# Patient Record
Sex: FEMALE | Race: WHITE | Employment: UNEMPLOYED | URBAN - METROPOLITAN AREA
[De-identification: names, ages, dates, MRNs, and addresses within clinical notes are randomized per-mention and may not be internally consistent; named-entity substitution may affect disease eponyms.]

---

## 2018-05-22 PROBLEM — R68.84 PAIN IN LOWER JAW: Status: ACTIVE | Noted: 2018-05-22

## 2018-08-24 ENCOUNTER — HOSPITAL ENCOUNTER (OUTPATIENT)
Dept: MAMMOGRAPHY | Age: 58
Discharge: HOME | End: 2018-08-24
Attending: NURSE PRACTITIONER
Payer: COMMERCIAL

## 2018-08-24 DIAGNOSIS — Z12.31 ENCOUNTER FOR SCREENING MAMMOGRAM FOR MALIGNANT NEOPLASM OF BREAST: ICD-10-CM

## 2018-08-24 PROCEDURE — 77067 SCR MAMMO BI INCL CAD: CPT

## 2019-11-13 ENCOUNTER — HOSPITAL ENCOUNTER (OUTPATIENT)
Dept: MAMMOGRAPHY | Age: 59
Discharge: HOME | End: 2019-11-13
Attending: NURSE PRACTITIONER
Payer: COMMERCIAL

## 2019-11-13 DIAGNOSIS — Z79.890 SURGICAL MENOPAUSE ON HORMONE REPLACEMENT THERAPY: ICD-10-CM

## 2019-11-13 DIAGNOSIS — E89.40 SURGICAL MENOPAUSE ON HORMONE REPLACEMENT THERAPY: ICD-10-CM

## 2019-11-13 DIAGNOSIS — Z12.39 ENCOUNTER FOR SCREENING FOR MALIGNANT NEOPLASM OF BREAST: ICD-10-CM

## 2019-11-13 PROCEDURE — 77080 DXA BONE DENSITY AXIAL: CPT

## 2019-11-13 PROCEDURE — 77067 SCR MAMMO BI INCL CAD: CPT

## 2020-10-22 ENCOUNTER — APPOINTMENT (RX ONLY)
Dept: URBAN - METROPOLITAN AREA CLINIC 23 | Facility: CLINIC | Age: 60
Setting detail: DERMATOLOGY
End: 2020-10-22

## 2020-10-22 VITALS — TEMPERATURE: 98.4 F

## 2020-10-22 DIAGNOSIS — D485 NEOPLASM OF UNCERTAIN BEHAVIOR OF SKIN: ICD-10-CM

## 2020-10-22 DIAGNOSIS — D18.0 HEMANGIOMA: ICD-10-CM

## 2020-10-22 DIAGNOSIS — D22 MELANOCYTIC NEVI: ICD-10-CM

## 2020-10-22 DIAGNOSIS — L82.1 OTHER SEBORRHEIC KERATOSIS: ICD-10-CM

## 2020-10-22 PROBLEM — D22.72 MELANOCYTIC NEVI OF LEFT LOWER LIMB, INCLUDING HIP: Status: ACTIVE | Noted: 2020-10-22

## 2020-10-22 PROBLEM — D48.5 NEOPLASM OF UNCERTAIN BEHAVIOR OF SKIN: Status: ACTIVE | Noted: 2020-10-22

## 2020-10-22 PROBLEM — D18.01 HEMANGIOMA OF SKIN AND SUBCUTANEOUS TISSUE: Status: ACTIVE | Noted: 2020-10-22

## 2020-10-22 PROBLEM — D22.5 MELANOCYTIC NEVI OF TRUNK: Status: ACTIVE | Noted: 2020-10-22

## 2020-10-22 PROCEDURE — 11102 TANGNTL BX SKIN SINGLE LES: CPT

## 2020-10-22 PROCEDURE — ? BIOPSY BY SHAVE METHOD

## 2020-10-22 PROCEDURE — ? OTHER

## 2020-10-22 PROCEDURE — ? COUNSELING

## 2020-10-22 PROCEDURE — 99203 OFFICE O/P NEW LOW 30 MIN: CPT | Mod: 25

## 2020-10-22 PROCEDURE — 11103 TANGNTL BX SKIN EA SEP/ADDL: CPT

## 2020-10-22 ASSESSMENT — LOCATION SIMPLE DESCRIPTION DERM
LOCATION SIMPLE: LEFT UPPER BACK
LOCATION SIMPLE: LEFT THIGH
LOCATION SIMPLE: LOWER BACK
LOCATION SIMPLE: RIGHT LOWER BACK
LOCATION SIMPLE: ABDOMEN
LOCATION SIMPLE: LEFT POSTERIOR THIGH

## 2020-10-22 ASSESSMENT — LOCATION DETAILED DESCRIPTION DERM
LOCATION DETAILED: LEFT SUPERIOR UPPER BACK
LOCATION DETAILED: RIGHT RIB CAGE
LOCATION DETAILED: LEFT DISTAL POSTERIOR THIGH
LOCATION DETAILED: LEFT ANTERIOR MEDIAL PROXIMAL THIGH
LOCATION DETAILED: LEFT MEDIAL UPPER BACK
LOCATION DETAILED: LEFT ANTERIOR PROXIMAL THIGH
LOCATION DETAILED: RIGHT INFERIOR LATERAL MIDBACK
LOCATION DETAILED: LEFT RIB CAGE
LOCATION DETAILED: EPIGASTRIC SKIN
LOCATION DETAILED: SUPERIOR LUMBAR SPINE

## 2020-10-22 ASSESSMENT — LOCATION ZONE DERM
LOCATION ZONE: LEG
LOCATION ZONE: TRUNK

## 2020-10-22 NOTE — PROCEDURE: BIOPSY BY SHAVE METHOD
Validate Note Data (See Information Below): No
Additional Anesthesia Volume In Cc (Will Not Render If 0): 0
Electrodesiccation And Curettage Text: The wound bed was treated with electrodesiccation and curettage after the biopsy was performed.
Information: Selecting Yes will display possible errors in your note based on the variables you have selected. This validation is only offered as a suggestion for you. PLEASE NOTE THAT THE VALIDATION TEXT WILL BE REMOVED WHEN YOU FINALIZE YOUR NOTE. IF YOU WANT TO FAX A PRELIMINARY NOTE YOU WILL NEED TO TOGGLE THIS TO 'NO' IF YOU DO NOT WANT IT IN YOUR FAXED NOTE.
Post-Care Instructions: I reviewed with the patient in detail post-care instructions. Patient is to keep the biopsy site dry overnight, and then apply bacitracin twice daily until healed. Patient may apply hydrogen peroxide soaks to remove any crusting.
Cryotherapy Text: The wound bed was treated with cryotherapy after the biopsy was performed.
Biopsy Type: H and E
Hemostasis: Aluminum Chloride
Type Of Destruction Used: Curettage
Wound Care: Petrolatum
Biopsy Method: double edge Personna blade
Curettage Text: The wound bed was treated with curettage after the biopsy was performed.
Billing Type: Third-Party Bill
Anesthesia Type: 1% lidocaine without epinephrine and a 1:10 solution of 8.4% sodium bicarbonate
Was A Bandage Applied: Yes
Electrodesiccation Text: The wound bed was treated with electrodesiccation after the biopsy was performed.
Accession #: [id number]
Silver Nitrate Text: The wound bed was treated with silver nitrate after the biopsy was performed.
Anesthesia Volume In Cc: 0.5
Depth Of Biopsy: dermis
Consent: Verbal consent was obtained and risks were reviewed including but not limited to scarring, infection, bleeding, scabbing, incomplete removal, nerve damage and allergy to anesthesia.
Notification Instructions: Patient will be notified of biopsy results. However, patient instructed to call the office if not contacted within 2 weeks.
Path Notes Override (Will Replace All Of The Above Text): 2 pieces
Detail Level: Detailed
Dressing: bandage

## 2020-10-22 NOTE — PROCEDURE: OTHER
Other (Free Text): 11 lesions treated with LN2 - quoted 5/$50.
Detail Level: Zone
Note Text (......Xxx Chief Complaint.): This diagnosis correlates with the

## 2021-03-22 PROBLEM — M54.16 LUMBAR RADICULOPATHY: Status: ACTIVE | Noted: 2021-03-22

## 2021-03-22 PROBLEM — M48.062 SPINAL STENOSIS OF LUMBAR REGION WITH NEUROGENIC CLAUDICATION: Status: ACTIVE | Noted: 2021-03-22

## 2021-08-17 ENCOUNTER — HOSPITAL ENCOUNTER (OUTPATIENT)
Dept: GENERAL RADIOLOGY | Age: 61
Discharge: HOME | End: 2021-08-17

## 2021-08-17 DIAGNOSIS — M48.062 SPINAL STENOSIS OF LUMBAR REGION WITH NEUROGENIC CLAUDICATION: ICD-10-CM

## 2021-08-17 DIAGNOSIS — E66.01 MORBID OBESITY (HCC): ICD-10-CM

## 2021-08-17 DIAGNOSIS — M54.16 LUMBAR RADICULOPATHY: ICD-10-CM

## 2021-08-17 PROCEDURE — 72100 X-RAY EXAM L-S SPINE 2/3 VWS: CPT

## 2021-08-23 ENCOUNTER — TRANSCRIBE ORDER (OUTPATIENT)
Dept: SCHEDULING | Age: 61
End: 2021-08-23

## 2021-08-23 DIAGNOSIS — Z12.31 VISIT FOR SCREENING MAMMOGRAM: Primary | ICD-10-CM

## 2021-08-27 ENCOUNTER — HOSPITAL ENCOUNTER (OUTPATIENT)
Dept: MAMMOGRAPHY | Age: 61
Discharge: HOME | End: 2021-08-27
Attending: FAMILY MEDICINE

## 2021-08-27 DIAGNOSIS — Z12.31 VISIT FOR SCREENING MAMMOGRAM: ICD-10-CM

## 2021-08-27 PROCEDURE — 77067 SCR MAMMO BI INCL CAD: CPT

## 2021-09-01 ENCOUNTER — HOSPITAL ENCOUNTER (OUTPATIENT)
Dept: SURGERY | Age: 61
Discharge: HOME | End: 2021-09-01
Attending: NEUROLOGICAL SURGERY

## 2021-09-01 NOTE — PERIOP NOTES
Patient did not show for PAT appointment. Called patient and she stated she was sick. I instructed her to call surgeon's office to reschedule appt.

## 2021-09-07 ENCOUNTER — HOSPITAL ENCOUNTER (OUTPATIENT)
Dept: SURGERY | Age: 61
Discharge: HOME | End: 2021-09-07
Attending: NEUROLOGICAL SURGERY

## 2021-09-07 ENCOUNTER — ANESTHESIA EVENT (OUTPATIENT)
Dept: SURGERY | Age: 61
End: 2021-09-07

## 2021-09-07 VITALS
WEIGHT: 250.2 LBS | OXYGEN SATURATION: 97 % | TEMPERATURE: 97.7 F | BODY MASS INDEX: 39.27 KG/M2 | HEART RATE: 99 BPM | DIASTOLIC BLOOD PRESSURE: 87 MMHG | RESPIRATION RATE: 16 BRPM | SYSTOLIC BLOOD PRESSURE: 126 MMHG | HEIGHT: 67 IN

## 2021-09-07 LAB
ANION GAP SERPL CALC-SCNC: 7 MMOL/L (ref 7–16)
APPEARANCE UR: ABNORMAL
BACTERIA SPEC CULT: NORMAL
BACTERIA URNS QL MICRO: ABNORMAL /HPF
BASOPHILS # BLD: 0.1 K/UL (ref 0–0.2)
BASOPHILS NFR BLD: 1 % (ref 0–2)
BILIRUB UR QL: ABNORMAL
BUN SERPL-MCNC: 21 MG/DL (ref 8–23)
CALCIUM SERPL-MCNC: 9.1 MG/DL (ref 8.3–10.4)
CASTS URNS QL MICRO: 0 /LPF
CHLORIDE SERPL-SCNC: 104 MMOL/L (ref 98–107)
CO2 SERPL-SCNC: 28 MMOL/L (ref 21–32)
COLOR UR: ABNORMAL
CREAT SERPL-MCNC: 0.76 MG/DL (ref 0.6–1)
CRYSTALS URNS QL MICRO: ABNORMAL /LPF
DIFFERENTIAL METHOD BLD: ABNORMAL
EOSINOPHIL # BLD: 0.1 K/UL (ref 0–0.8)
EOSINOPHIL NFR BLD: 1 % (ref 0.5–7.8)
EPI CELLS #/AREA URNS HPF: ABNORMAL /HPF
ERYTHROCYTE [DISTWIDTH] IN BLOOD BY AUTOMATED COUNT: 12.8 % (ref 11.9–14.6)
GLUCOSE SERPL-MCNC: 98 MG/DL (ref 65–100)
GLUCOSE UR STRIP.AUTO-MCNC: NEGATIVE MG/DL
HCT VFR BLD AUTO: 38.7 % (ref 35.8–46.3)
HGB BLD-MCNC: 13 G/DL (ref 11.7–15.4)
HGB UR QL STRIP: NEGATIVE
IMM GRANULOCYTES # BLD AUTO: 0 K/UL (ref 0–0.5)
IMM GRANULOCYTES NFR BLD AUTO: 1 % (ref 0–5)
KETONES UR QL STRIP.AUTO: ABNORMAL MG/DL
LEUKOCYTE ESTERASE UR QL STRIP.AUTO: NEGATIVE
LYMPHOCYTES # BLD: 2.2 K/UL (ref 0.5–4.6)
LYMPHOCYTES NFR BLD: 29 % (ref 13–44)
MCH RBC QN AUTO: 31 PG (ref 26.1–32.9)
MCHC RBC AUTO-ENTMCNC: 33.6 G/DL (ref 31.4–35)
MCV RBC AUTO: 92.4 FL (ref 79.6–97.8)
MONOCYTES # BLD: 0.5 K/UL (ref 0.1–1.3)
MONOCYTES NFR BLD: 7 % (ref 4–12)
NEUTS SEG # BLD: 4.8 K/UL (ref 1.7–8.2)
NEUTS SEG NFR BLD: 63 % (ref 43–78)
NITRITE UR QL STRIP.AUTO: NEGATIVE
NRBC # BLD: 0 K/UL (ref 0–0.2)
PH UR STRIP: 5 [PH] (ref 5–9)
PLATELET # BLD AUTO: 415 K/UL (ref 150–450)
PMV BLD AUTO: 8.4 FL (ref 9.4–12.3)
POTASSIUM SERPL-SCNC: 3.6 MMOL/L (ref 3.5–5.1)
PROT UR STRIP-MCNC: NEGATIVE MG/DL
RBC # BLD AUTO: 4.19 M/UL (ref 4.05–5.2)
RBC #/AREA URNS HPF: ABNORMAL /HPF
SERVICE CMNT-IMP: NORMAL
SODIUM SERPL-SCNC: 139 MMOL/L (ref 136–145)
SP GR UR REFRACTOMETRY: 1.04 (ref 1–1.02)
UROBILINOGEN UR QL STRIP.AUTO: 1 EU/DL (ref 0.2–1)
WBC # BLD AUTO: 7.7 K/UL (ref 4.3–11.1)
WBC URNS QL MICRO: ABNORMAL /HPF

## 2021-09-07 PROCEDURE — 81003 URINALYSIS AUTO W/O SCOPE: CPT

## 2021-09-07 PROCEDURE — 80048 BASIC METABOLIC PNL TOTAL CA: CPT

## 2021-09-07 PROCEDURE — 87641 MR-STAPH DNA AMP PROBE: CPT

## 2021-09-07 PROCEDURE — 85025 COMPLETE CBC W/AUTO DIFF WBC: CPT

## 2021-09-07 RX ORDER — TRIAMTERENE AND HYDROCHLOROTHIAZIDE 37.5; 25 MG/1; MG/1
CAPSULE ORAL DAILY
COMMUNITY

## 2021-09-07 RX ORDER — OMEPRAZOLE 40 MG/1
40 CAPSULE, DELAYED RELEASE ORAL DAILY
COMMUNITY

## 2021-09-07 RX ORDER — GABAPENTIN 300 MG/1
300 CAPSULE ORAL 3 TIMES DAILY
COMMUNITY

## 2021-09-07 RX ORDER — ATORVASTATIN CALCIUM 40 MG/1
TABLET, FILM COATED ORAL
COMMUNITY

## 2021-09-07 RX ORDER — ATENOLOL 50 MG/1
TABLET ORAL DAILY
COMMUNITY

## 2021-09-07 RX ORDER — BENAZEPRIL HYDROCHLORIDE 40 MG/1
40 TABLET ORAL DAILY
COMMUNITY
End: 2021-12-09

## 2021-09-07 NOTE — PERIOP NOTES
Patient verified name, , and surgery as listed in [facility]. Patient provided medical/health information and PTA medications to the best of their ability. TYPE  CASE: III  Orders per surgeon: not received prior to PAT visit  Labs per surgeon: CBC w/, BMP, MSSA/MRSA, UA  Labs per anesthesia protocol: Type & Sreen DOS  EKG:  Not required     Patient COVID test date 21; Patient did  show for the appointment. The testing center is located at the [address], [place]. If appointment is needed patient provided telephone number of [phone]. Nasal Swab collected per MD order. Patient provided with and instructed on education handouts including Guide to Surgery, blood transfusions, pain management, and hand hygiene for the family and community, and [facility] [place]. Road to Recovery Spine surgery patient guide given. Instructed on incentive spirometry with return demonstration. Patient viewed spine prehab video. Hibiclens and instructions given per hospital policy. Original medication prescription bottles were visualized during patient appointment. Patient teach back successful and patient demonstrates knowledge of instruction.

## 2021-09-07 NOTE — PERIOP NOTES
PLEASE CONTINUE TAKING ALL PRESCRIPTION MEDICATIONS UP TO THE DAY OF SURGERY UNLESS OTHERWISE DIRECTED BELOW. DISCONTINUE all vitamins and supplements 7 days prior to surgery. DISCONTINUE Non-Steriodal Anti-Inflammatory (NSAIDS) such as Advil and Aleve 5 days prior to surgery. Home Medications to take  the day of surgery    Atenolol (Tenormin)   Estradiol (Estrace)   gabapentin (Neurontin)   Lorazepam (Ativan) if needed   Omeprazole (Prilosec)     Home Medications   to Hold   Benazepril (Lotensin) hold morning of surgery  triamterene-hydrochlorothiazide (maxide) hold morning of surgery        Comments      On the day before surgery please take Acetaminophen 1000mg in the morning and then again before bed. You may substitute for Tylenol 650 mg. How to Use Your Incentive Spirometer       About Your Incentive Spirometer  An incentive spirometer is a device that will expand your lungs by helping you to breathe more deeply and fully. The parts of your incentive spirometer are labeled in Figure 1. Using your incentive spirometer  When youre using your incentive spirometer, make sure to breathe through your mouth. If you breathe through your nose, the incentive spirometer wont work properly. You can hold your nose if you have trouble. DO NOT BLOW INTO THE DEVICE. If you feel dizzy at any time, stop and rest. Try again at a later time. 1. Sit upright in a chair or in bed. Hold the incentive spirometer at eye level. 2. Put the mouthpiece in your mouth and close your lips tightly around it. Slowly breathe out (exhale) completely. 3. Breathe in (inhale) slowly through your mouth as deeply as you can. As you take the breath, you will see the piston rise inside the large column. While the piston rises, the indicator on the right should move upwards. It should stay in between the 2 arrows (see Figure 1). 4. Try to get the piston as high as you can, while keeping the indicator between the arrows.  If the indicator doesnt stay between the arrows, youre breathing either too fast or too slow. 5. When you get it as high as you can, hold your breath for 10 seconds, or as long as possible. While youre holding your breath, the piston will slowly fall to the base of the spirometer. 6. Once the piston reaches the bottom of the spirometer, breathe out slowly through your mouth. Rest for a few seconds. 7. Repeat 10 times. Try to get the piston to the same level with each breath. 8. After each set of 10 breaths, try to cough as coughing will help loosen or clear any mucus in your lungs. 9. Put the marker at the level the piston reached on your incentive spirometer. This will be your goal next time. Repeat these steps every hour that youre awake. Cover the mouthpiece of the incentive spirometer when you arent using it    Please do not bring home medications with you on the day of surgery unless otherwise directed by your nurse. If you are instructed to bring home medications, please give them to your nurse as they will be administered by the nursing staff. If you have any questions, please call [facility] [phone] or [facility] [phone]. A copy of this note was provided to the patient for reference.

## 2021-09-08 ENCOUNTER — APPOINTMENT (OUTPATIENT)
Dept: GENERAL RADIOLOGY | Age: 61
End: 2021-09-08
Attending: NEUROLOGICAL SURGERY

## 2021-09-08 ENCOUNTER — ANESTHESIA (OUTPATIENT)
Dept: SURGERY | Age: 61
End: 2021-09-08

## 2021-09-08 ENCOUNTER — HOSPITAL ENCOUNTER (OUTPATIENT)
Age: 61
Setting detail: OBSERVATION
LOS: 1 days | Discharge: HOME | End: 2021-09-09
Attending: NEUROLOGICAL SURGERY | Admitting: NEUROLOGICAL SURGERY

## 2021-09-08 DIAGNOSIS — M48.062 SPINAL STENOSIS OF LUMBAR REGION WITH NEUROGENIC CLAUDICATION: Primary | ICD-10-CM

## 2021-09-08 LAB
ABO + RH BLD: NORMAL
BLOOD GROUP ANTIBODIES SERPL: NORMAL
POTASSIUM BLD-SCNC: 3.6 MMOL/L (ref 3.5–5.1)
SPECIMEN EXP DATE BLD: NORMAL

## 2021-09-08 PROCEDURE — 77030012894: Performed by: NEUROLOGICAL SURGERY

## 2021-09-08 PROCEDURE — 74011000250 HC RX REV CODE- 250: Performed by: NURSE ANESTHETIST, CERTIFIED REGISTERED

## 2021-09-08 PROCEDURE — 77030040922 HC BLNKT HYPOTHRM STRY -A: Performed by: ANESTHESIOLOGY

## 2021-09-08 PROCEDURE — 22853 INSJ BIOMECHANICAL DEVICE: CPT | Performed by: NEUROLOGICAL SURGERY

## 2021-09-08 PROCEDURE — 20936 SP BONE AGRFT LOCAL ADD-ON: CPT | Performed by: NEUROLOGICAL SURGERY

## 2021-09-08 PROCEDURE — 99218 HC RM OBSERVATION: CPT

## 2021-09-08 PROCEDURE — 22630 ARTHRD PST TQ 1NTRSPC LUM: CPT | Performed by: NEUROLOGICAL SURGERY

## 2021-09-08 PROCEDURE — 74011250636 HC RX REV CODE- 250/636: Performed by: NEUROLOGICAL SURGERY

## 2021-09-08 PROCEDURE — 77030038601 HC DEV SYS W/CANN LITE BIO STRY -F: Performed by: NEUROLOGICAL SURGERY

## 2021-09-08 PROCEDURE — 76060000036 HC ANESTHESIA 2.5 TO 3 HR: Performed by: NEUROLOGICAL SURGERY

## 2021-09-08 PROCEDURE — 77030003029 HC SUT VCRL J&J -B: Performed by: NEUROLOGICAL SURGERY

## 2021-09-08 PROCEDURE — 97530 THERAPEUTIC ACTIVITIES: CPT | Performed by: PHYSICAL THERAPIST

## 2021-09-08 PROCEDURE — 20939 BONE MARROW ASPIR BONE GRFG: CPT | Performed by: NEUROLOGICAL SURGERY

## 2021-09-08 PROCEDURE — 77030014007 HC SPNG HEMSTAT J&J -B: Performed by: NEUROLOGICAL SURGERY

## 2021-09-08 PROCEDURE — 74011250636 HC RX REV CODE- 250/636: Performed by: NURSE ANESTHETIST, CERTIFIED REGISTERED

## 2021-09-08 PROCEDURE — 84132 ASSAY OF SERUM POTASSIUM: CPT

## 2021-09-08 PROCEDURE — 72100 X-RAY EXAM L-S SPINE 2/3 VWS: CPT

## 2021-09-08 PROCEDURE — 77030018390 HC SPNG HEMSTAT2 J&J -B: Performed by: NEUROLOGICAL SURGERY

## 2021-09-08 PROCEDURE — 74011250636 HC RX REV CODE- 250/636: Performed by: ANESTHESIOLOGY

## 2021-09-08 PROCEDURE — 77030008468 HC STPLR SKN VISIS TELE -A: Performed by: NEUROLOGICAL SURGERY

## 2021-09-08 PROCEDURE — 77030037804 HC GRFT BNE GRAN OSTEOAMP BTUS -F: Performed by: NEUROLOGICAL SURGERY

## 2021-09-08 PROCEDURE — 77030020269 HC MISC IMPL: Performed by: NEUROLOGICAL SURGERY

## 2021-09-08 PROCEDURE — 88304 TISSUE EXAM BY PATHOLOGIST: CPT

## 2021-09-08 PROCEDURE — 77030019908 HC STETH ESOPH SIMS -A: Performed by: ANESTHESIOLOGY

## 2021-09-08 PROCEDURE — 97161 PT EVAL LOW COMPLEX 20 MIN: CPT | Performed by: PHYSICAL THERAPIST

## 2021-09-08 PROCEDURE — 86901 BLOOD TYPING SEROLOGIC RH(D): CPT

## 2021-09-08 PROCEDURE — 22840 INSERT SPINE FIXATION DEVICE: CPT | Performed by: NEUROLOGICAL SURGERY

## 2021-09-08 PROCEDURE — C1889 IMPLANT/INSERT DEVICE, NOC: HCPCS | Performed by: NEUROLOGICAL SURGERY

## 2021-09-08 PROCEDURE — 77030034475 HC MISC IMPL SPN: Performed by: NEUROLOGICAL SURGERY

## 2021-09-08 PROCEDURE — 74011000250 HC RX REV CODE- 250: Performed by: NEUROLOGICAL SURGERY

## 2021-09-08 PROCEDURE — 74011250637 HC RX REV CODE- 250/637: Performed by: NEUROLOGICAL SURGERY

## 2021-09-08 PROCEDURE — 77030028270 HC SRGFL HEMSTAT MTRX J&J -C: Performed by: NEUROLOGICAL SURGERY

## 2021-09-08 PROCEDURE — 74011250637 HC RX REV CODE- 250/637: Performed by: ANESTHESIOLOGY

## 2021-09-08 PROCEDURE — C1713 ANCHOR/SCREW BN/BN,TIS/BN: HCPCS | Performed by: NEUROLOGICAL SURGERY

## 2021-09-08 PROCEDURE — 76210000006 HC OR PH I REC 0.5 TO 1 HR: Performed by: NEUROLOGICAL SURGERY

## 2021-09-08 PROCEDURE — 2709999900 HC NON-CHARGEABLE SUPPLY: Performed by: NEUROLOGICAL SURGERY

## 2021-09-08 PROCEDURE — 74011000272 HC RX REV CODE- 272: Performed by: NEUROLOGICAL SURGERY

## 2021-09-08 PROCEDURE — 77030039425 HC BLD LARYNG TRULITE DISP TELE -A: Performed by: ANESTHESIOLOGY

## 2021-09-08 PROCEDURE — 77030034760 HC NDL BN MAR ASPIR JAMSH STRY -B: Performed by: NEUROLOGICAL SURGERY

## 2021-09-08 PROCEDURE — 2709999900 HC NON-CHARGEABLE SUPPLY

## 2021-09-08 PROCEDURE — 74011000258 HC RX REV CODE- 258: Performed by: NEUROLOGICAL SURGERY

## 2021-09-08 PROCEDURE — 77030037088 HC TUBE ENDOTRACH ORAL NSL COVD-A: Performed by: ANESTHESIOLOGY

## 2021-09-08 PROCEDURE — 77030019557 HC ELECTRD VES SEAL MEDT -F: Performed by: NEUROLOGICAL SURGERY

## 2021-09-08 PROCEDURE — 76010000172 HC OR TIME 2.5 TO 3 HR INTENSV-TIER 1: Performed by: NEUROLOGICAL SURGERY

## 2021-09-08 DEVICE — SET SCREW
Type: IMPLANTABLE DEVICE | Site: SPINE LUMBAR | Status: FUNCTIONAL
Brand: FIREBIRD NXG

## 2021-09-08 DEVICE — GRAFT BNE SUB 25CC 2MM GRAN ALLOGENIC MORPHOGENETIC PROT W: Type: IMPLANTABLE DEVICE | Site: SPINE LUMBAR | Status: FUNCTIONAL

## 2021-09-08 DEVICE — 6.5MM X 50MM CORTICAL BONE SCREW
Type: IMPLANTABLE DEVICE | Site: SPINE LUMBAR | Status: FUNCTIONAL
Brand: JANUS

## 2021-09-08 DEVICE — TOP LOADING BODY
Type: IMPLANTABLE DEVICE | Site: SPINE LUMBAR | Status: FUNCTIONAL
Brand: FIREBIRD NXG

## 2021-09-08 DEVICE — 50MM ROD, PRE-LORDOSED
Type: IMPLANTABLE DEVICE | Site: SPINE LUMBAR | Status: FUNCTIONAL
Brand: FIREBIRD

## 2021-09-08 DEVICE — 10.5MM X 10MM WIDE X 24MM LORDOTIC ASSEMBLY
Type: IMPLANTABLE DEVICE | Site: SPINE LUMBAR | Status: FUNCTIONAL
Brand: FORZA XP

## 2021-09-08 RX ORDER — OXYCODONE AND ACETAMINOPHEN 10; 325 MG/1; MG/1
1 TABLET ORAL
Status: DISCONTINUED | OUTPATIENT
Start: 2021-09-08 | End: 2021-09-09 | Stop reason: HOSPADM

## 2021-09-08 RX ORDER — TRIAMTERENE/HYDROCHLOROTHIAZID 37.5-25 MG
1 TABLET ORAL DAILY
Status: DISCONTINUED | OUTPATIENT
Start: 2021-09-09 | End: 2021-09-09 | Stop reason: HOSPADM

## 2021-09-08 RX ORDER — FENTANYL CITRATE 50 UG/ML
INJECTION, SOLUTION INTRAMUSCULAR; INTRAVENOUS AS NEEDED
Status: DISCONTINUED | OUTPATIENT
Start: 2021-09-08 | End: 2021-09-08 | Stop reason: HOSPADM

## 2021-09-08 RX ORDER — PROPOFOL 10 MG/ML
INJECTION, EMULSION INTRAVENOUS AS NEEDED
Status: DISCONTINUED | OUTPATIENT
Start: 2021-09-08 | End: 2021-09-08 | Stop reason: HOSPADM

## 2021-09-08 RX ORDER — APREPITANT 40 MG/1
40 CAPSULE ORAL ONCE
Status: COMPLETED | OUTPATIENT
Start: 2021-09-08 | End: 2021-09-08

## 2021-09-08 RX ORDER — ATORVASTATIN CALCIUM 40 MG/1
40 TABLET, FILM COATED ORAL
Status: DISCONTINUED | OUTPATIENT
Start: 2021-09-08 | End: 2021-09-09 | Stop reason: HOSPADM

## 2021-09-08 RX ORDER — ZOLPIDEM TARTRATE 5 MG/1
5 TABLET ORAL
Status: DISCONTINUED | OUTPATIENT
Start: 2021-09-08 | End: 2021-09-09 | Stop reason: HOSPADM

## 2021-09-08 RX ORDER — ACETAMINOPHEN 325 MG/1
650 TABLET ORAL
Status: DISCONTINUED | OUTPATIENT
Start: 2021-09-08 | End: 2021-09-09 | Stop reason: HOSPADM

## 2021-09-08 RX ORDER — LIDOCAINE HYDROCHLORIDE 20 MG/ML
INJECTION, SOLUTION EPIDURAL; INFILTRATION; INTRACAUDAL; PERINEURAL AS NEEDED
Status: DISCONTINUED | OUTPATIENT
Start: 2021-09-08 | End: 2021-09-08 | Stop reason: HOSPADM

## 2021-09-08 RX ORDER — SODIUM CHLORIDE, SODIUM LACTATE, POTASSIUM CHLORIDE, CALCIUM CHLORIDE 600; 310; 30; 20 MG/100ML; MG/100ML; MG/100ML; MG/100ML
100 INJECTION, SOLUTION INTRAVENOUS CONTINUOUS
Status: DISPENSED | OUTPATIENT
Start: 2021-09-08 | End: 2021-09-09

## 2021-09-08 RX ORDER — ATENOLOL 50 MG/1
50 TABLET ORAL ONCE
Status: COMPLETED | OUTPATIENT
Start: 2021-09-08 | End: 2021-09-08

## 2021-09-08 RX ORDER — DEXAMETHASONE SODIUM PHOSPHATE 4 MG/ML
INJECTION, SOLUTION INTRA-ARTICULAR; INTRALESIONAL; INTRAMUSCULAR; INTRAVENOUS; SOFT TISSUE AS NEEDED
Status: DISCONTINUED | OUTPATIENT
Start: 2021-09-08 | End: 2021-09-08 | Stop reason: HOSPADM

## 2021-09-08 RX ORDER — ONDANSETRON 2 MG/ML
INJECTION INTRAMUSCULAR; INTRAVENOUS AS NEEDED
Status: DISCONTINUED | OUTPATIENT
Start: 2021-09-08 | End: 2021-09-08 | Stop reason: HOSPADM

## 2021-09-08 RX ORDER — GLYCOPYRROLATE 0.2 MG/ML
INJECTION INTRAMUSCULAR; INTRAVENOUS AS NEEDED
Status: DISCONTINUED | OUTPATIENT
Start: 2021-09-08 | End: 2021-09-08 | Stop reason: HOSPADM

## 2021-09-08 RX ORDER — GABAPENTIN 300 MG/1
300 CAPSULE ORAL 3 TIMES DAILY
Status: DISCONTINUED | OUTPATIENT
Start: 2021-09-08 | End: 2021-09-09 | Stop reason: HOSPADM

## 2021-09-08 RX ORDER — NALOXONE HYDROCHLORIDE 0.4 MG/ML
0.1 INJECTION, SOLUTION INTRAMUSCULAR; INTRAVENOUS; SUBCUTANEOUS
Status: DISCONTINUED | OUTPATIENT
Start: 2021-09-08 | End: 2021-09-08 | Stop reason: HOSPADM

## 2021-09-08 RX ORDER — LIDOCAINE HYDROCHLORIDE 10 MG/ML
0.1 INJECTION INFILTRATION; PERINEURAL AS NEEDED
Status: DISCONTINUED | OUTPATIENT
Start: 2021-09-08 | End: 2021-09-09 | Stop reason: HOSPADM

## 2021-09-08 RX ORDER — THROMBIN, TOPICAL (BOVINE) 20000 UNIT
KIT TOPICAL AS NEEDED
Status: DISCONTINUED | OUTPATIENT
Start: 2021-09-08 | End: 2021-09-08 | Stop reason: HOSPADM

## 2021-09-08 RX ORDER — HYDROMORPHONE HYDROCHLORIDE 1 MG/ML
2 INJECTION, SOLUTION INTRAMUSCULAR; INTRAVENOUS; SUBCUTANEOUS
Status: DISCONTINUED | OUTPATIENT
Start: 2021-09-08 | End: 2021-09-09 | Stop reason: HOSPADM

## 2021-09-08 RX ORDER — HYDROMORPHONE HYDROCHLORIDE 2 MG/ML
0.5 INJECTION, SOLUTION INTRAMUSCULAR; INTRAVENOUS; SUBCUTANEOUS
Status: COMPLETED | OUTPATIENT
Start: 2021-09-08 | End: 2021-09-08

## 2021-09-08 RX ORDER — KETAMINE HYDROCHLORIDE 50 MG/ML
INJECTION, SOLUTION INTRAMUSCULAR; INTRAVENOUS AS NEEDED
Status: DISCONTINUED | OUTPATIENT
Start: 2021-09-08 | End: 2021-09-08 | Stop reason: HOSPADM

## 2021-09-08 RX ORDER — DIPHENHYDRAMINE HYDROCHLORIDE 50 MG/ML
12.5 INJECTION, SOLUTION INTRAMUSCULAR; INTRAVENOUS
Status: DISCONTINUED | OUTPATIENT
Start: 2021-09-08 | End: 2021-09-08 | Stop reason: HOSPADM

## 2021-09-08 RX ORDER — PANTOPRAZOLE SODIUM 40 MG/1
40 TABLET, DELAYED RELEASE ORAL DAILY
Status: DISCONTINUED | OUTPATIENT
Start: 2021-09-09 | End: 2021-09-09 | Stop reason: HOSPADM

## 2021-09-08 RX ORDER — VANCOMYCIN/0.9 % SOD CHLORIDE 1.5G/250ML
1500 PLASTIC BAG, INJECTION (ML) INTRAVENOUS EVERY 12 HOURS
Status: DISCONTINUED | OUTPATIENT
Start: 2021-09-08 | End: 2021-09-09 | Stop reason: HOSPADM

## 2021-09-08 RX ORDER — SODIUM CHLORIDE 9 MG/ML
INJECTION, SOLUTION INTRAVENOUS
Status: DISCONTINUED | OUTPATIENT
Start: 2021-09-08 | End: 2021-09-08 | Stop reason: HOSPADM

## 2021-09-08 RX ORDER — FLUMAZENIL 0.1 MG/ML
0.2 INJECTION INTRAVENOUS
Status: DISCONTINUED | OUTPATIENT
Start: 2021-09-08 | End: 2021-09-08 | Stop reason: HOSPADM

## 2021-09-08 RX ORDER — LORAZEPAM 1 MG/1
1 TABLET ORAL
Status: DISCONTINUED | OUTPATIENT
Start: 2021-09-08 | End: 2021-09-09 | Stop reason: HOSPADM

## 2021-09-08 RX ORDER — HALOPERIDOL 5 MG/ML
1 INJECTION INTRAMUSCULAR
Status: COMPLETED | OUTPATIENT
Start: 2021-09-08 | End: 2021-09-08

## 2021-09-08 RX ORDER — NEOSTIGMINE METHYLSULFATE 1 MG/ML
INJECTION, SOLUTION INTRAVENOUS AS NEEDED
Status: DISCONTINUED | OUTPATIENT
Start: 2021-09-08 | End: 2021-09-08 | Stop reason: HOSPADM

## 2021-09-08 RX ORDER — ROCURONIUM BROMIDE 10 MG/ML
INJECTION, SOLUTION INTRAVENOUS AS NEEDED
Status: DISCONTINUED | OUTPATIENT
Start: 2021-09-08 | End: 2021-09-08 | Stop reason: HOSPADM

## 2021-09-08 RX ORDER — SODIUM CHLORIDE 0.9 % (FLUSH) 0.9 %
5-40 SYRINGE (ML) INJECTION EVERY 8 HOURS
Status: DISCONTINUED | OUTPATIENT
Start: 2021-09-08 | End: 2021-09-09 | Stop reason: HOSPADM

## 2021-09-08 RX ORDER — ZOLPIDEM TARTRATE 5 MG/1
5 TABLET ORAL
Status: DISCONTINUED | OUTPATIENT
Start: 2021-09-08 | End: 2021-09-08 | Stop reason: SDUPTHER

## 2021-09-08 RX ORDER — ACETAMINOPHEN 500 MG
1000 TABLET ORAL ONCE
Status: COMPLETED | OUTPATIENT
Start: 2021-09-08 | End: 2021-09-08

## 2021-09-08 RX ORDER — SODIUM CHLORIDE 0.9 % (FLUSH) 0.9 %
5-40 SYRINGE (ML) INJECTION AS NEEDED
Status: DISCONTINUED | OUTPATIENT
Start: 2021-09-08 | End: 2021-09-09 | Stop reason: HOSPADM

## 2021-09-08 RX ORDER — MIDAZOLAM HYDROCHLORIDE 1 MG/ML
2 INJECTION, SOLUTION INTRAMUSCULAR; INTRAVENOUS
Status: DISCONTINUED | OUTPATIENT
Start: 2021-09-08 | End: 2021-09-08

## 2021-09-08 RX ORDER — LIDOCAINE HYDROCHLORIDE ANHYDROUS AND DEXTROSE MONOHYDRATE .8; 5 G/100ML; G/100ML
INJECTION, SOLUTION INTRAVENOUS
Status: DISCONTINUED | OUTPATIENT
Start: 2021-09-08 | End: 2021-09-08 | Stop reason: HOSPADM

## 2021-09-08 RX ORDER — SODIUM CHLORIDE, SODIUM LACTATE, POTASSIUM CHLORIDE, CALCIUM CHLORIDE 600; 310; 30; 20 MG/100ML; MG/100ML; MG/100ML; MG/100ML
100 INJECTION, SOLUTION INTRAVENOUS CONTINUOUS
Status: DISCONTINUED | OUTPATIENT
Start: 2021-09-08 | End: 2021-09-09 | Stop reason: HOSPADM

## 2021-09-08 RX ORDER — ATENOLOL 50 MG/1
50 TABLET ORAL DAILY
Status: DISCONTINUED | OUTPATIENT
Start: 2021-09-09 | End: 2021-09-09 | Stop reason: HOSPADM

## 2021-09-08 RX ORDER — OXYCODONE HYDROCHLORIDE 5 MG/1
5 TABLET ORAL
Status: DISCONTINUED | OUTPATIENT
Start: 2021-09-08 | End: 2021-09-08 | Stop reason: HOSPADM

## 2021-09-08 RX ORDER — LISINOPRIL 5 MG/1
5 TABLET ORAL DAILY
Status: DISCONTINUED | OUTPATIENT
Start: 2021-09-09 | End: 2021-09-09 | Stop reason: HOSPADM

## 2021-09-08 RX ORDER — ESTRADIOL 1 MG/1
1 TABLET ORAL DAILY
Status: DISCONTINUED | OUTPATIENT
Start: 2021-09-09 | End: 2021-09-09 | Stop reason: HOSPADM

## 2021-09-08 RX ORDER — SODIUM CHLORIDE, SODIUM LACTATE, POTASSIUM CHLORIDE, CALCIUM CHLORIDE 600; 310; 30; 20 MG/100ML; MG/100ML; MG/100ML; MG/100ML
75 INJECTION, SOLUTION INTRAVENOUS CONTINUOUS
Status: DISCONTINUED | OUTPATIENT
Start: 2021-09-08 | End: 2021-09-08 | Stop reason: HOSPADM

## 2021-09-08 RX ORDER — VANCOMYCIN/0.9 % SOD CHLORIDE 1.5G/250ML
1500 PLASTIC BAG, INJECTION (ML) INTRAVENOUS ONCE
Status: COMPLETED | OUTPATIENT
Start: 2021-09-08 | End: 2021-09-08

## 2021-09-08 RX ADMIN — PHENYLEPHRINE HYDROCHLORIDE 200 MCG: 10 INJECTION INTRAVENOUS at 09:19

## 2021-09-08 RX ADMIN — PHENYLEPHRINE HYDROCHLORIDE 200 MCG: 10 INJECTION INTRAVENOUS at 09:34

## 2021-09-08 RX ADMIN — ACETAMINOPHEN 1000 MG: 500 TABLET ORAL at 07:44

## 2021-09-08 RX ADMIN — KETAMINE HYDROCHLORIDE 25 MG: 50 INJECTION INTRAMUSCULAR; INTRAVENOUS at 10:14

## 2021-09-08 RX ADMIN — Medication 1 AMPULE: at 15:29

## 2021-09-08 RX ADMIN — GLYCOPYRROLATE 0.4 MG: 0.2 INJECTION, SOLUTION INTRAMUSCULAR; INTRAVENOUS at 10:36

## 2021-09-08 RX ADMIN — PHENYLEPHRINE HYDROCHLORIDE 200 MCG: 10 INJECTION INTRAVENOUS at 08:37

## 2021-09-08 RX ADMIN — PIPERACILLIN SODIUM,TAZOBACTAM SODIUM 3.38 G: 3; .375 INJECTION, POWDER, FOR SOLUTION INTRAVENOUS at 08:19

## 2021-09-08 RX ADMIN — FENTANYL CITRATE 25 MCG: 50 INJECTION INTRAMUSCULAR; INTRAVENOUS at 08:14

## 2021-09-08 RX ADMIN — PHENYLEPHRINE HYDROCHLORIDE 200 MCG: 10 INJECTION INTRAVENOUS at 09:43

## 2021-09-08 RX ADMIN — DEXAMETHASONE SODIUM PHOSPHATE 10 MG: 4 INJECTION, SOLUTION INTRAMUSCULAR; INTRAVENOUS at 08:45

## 2021-09-08 RX ADMIN — PHENYLEPHRINE HYDROCHLORIDE 200 MCG: 10 INJECTION INTRAVENOUS at 09:06

## 2021-09-08 RX ADMIN — ROCURONIUM BROMIDE 5 MG: 10 INJECTION, SOLUTION INTRAVENOUS at 09:21

## 2021-09-08 RX ADMIN — ATENOLOL 50 MG: 50 TABLET ORAL at 07:44

## 2021-09-08 RX ADMIN — FENTANYL CITRATE 50 MCG: 50 INJECTION INTRAMUSCULAR; INTRAVENOUS at 08:10

## 2021-09-08 RX ADMIN — GABAPENTIN 300 MG: 300 CAPSULE ORAL at 21:54

## 2021-09-08 RX ADMIN — PHENYLEPHRINE HYDROCHLORIDE 150 MCG: 10 INJECTION INTRAVENOUS at 08:42

## 2021-09-08 RX ADMIN — PHENYLEPHRINE HYDROCHLORIDE 200 MCG: 10 INJECTION INTRAVENOUS at 09:54

## 2021-09-08 RX ADMIN — SODIUM CHLORIDE: 900 INJECTION, SOLUTION INTRAVENOUS at 08:34

## 2021-09-08 RX ADMIN — SODIUM CHLORIDE, SODIUM LACTATE, POTASSIUM CHLORIDE, AND CALCIUM CHLORIDE 100 ML/HR: 600; 310; 30; 20 INJECTION, SOLUTION INTRAVENOUS at 07:43

## 2021-09-08 RX ADMIN — PROPOFOL 30 MG: 10 INJECTION, EMULSION INTRAVENOUS at 10:12

## 2021-09-08 RX ADMIN — PHENYLEPHRINE HYDROCHLORIDE 200 MCG: 10 INJECTION INTRAVENOUS at 09:26

## 2021-09-08 RX ADMIN — Medication 1 AMPULE: at 21:54

## 2021-09-08 RX ADMIN — PHENYLEPHRINE HYDROCHLORIDE 100 MCG: 10 INJECTION INTRAVENOUS at 09:14

## 2021-09-08 RX ADMIN — PHENYLEPHRINE HYDROCHLORIDE 100 MCG: 10 INJECTION INTRAVENOUS at 09:09

## 2021-09-08 RX ADMIN — PHENYLEPHRINE HYDROCHLORIDE 200 MCG: 10 INJECTION INTRAVENOUS at 09:41

## 2021-09-08 RX ADMIN — Medication 10 ML: at 21:54

## 2021-09-08 RX ADMIN — SODIUM CHLORIDE, SODIUM LACTATE, POTASSIUM CHLORIDE, AND CALCIUM CHLORIDE 100 ML/HR: 600; 310; 30; 20 INJECTION, SOLUTION INTRAVENOUS at 15:35

## 2021-09-08 RX ADMIN — LIDOCAINE HYDROCHLORIDE 100 MG: 20 INJECTION, SOLUTION EPIDURAL; INFILTRATION; INTRACAUDAL; PERINEURAL at 08:14

## 2021-09-08 RX ADMIN — Medication 10 ML: at 15:29

## 2021-09-08 RX ADMIN — PHENYLEPHRINE HYDROCHLORIDE 100 MCG: 10 INJECTION INTRAVENOUS at 08:17

## 2021-09-08 RX ADMIN — OXYCODONE HYDROCHLORIDE AND ACETAMINOPHEN 1 TABLET: 10; 325 TABLET ORAL at 18:16

## 2021-09-08 RX ADMIN — HYDROMORPHONE HYDROCHLORIDE 0.5 MG: 2 INJECTION INTRAMUSCULAR; INTRAVENOUS; SUBCUTANEOUS at 11:35

## 2021-09-08 RX ADMIN — VANCOMYCIN HYDROCHLORIDE 1500 MG: 10 INJECTION, POWDER, LYOPHILIZED, FOR SOLUTION INTRAVENOUS at 22:16

## 2021-09-08 RX ADMIN — HYDROMORPHONE HYDROCHLORIDE 0.25 MG: 2 INJECTION INTRAMUSCULAR; INTRAVENOUS; SUBCUTANEOUS at 11:25

## 2021-09-08 RX ADMIN — KETAMINE HYDROCHLORIDE 15 MG: 50 INJECTION INTRAMUSCULAR; INTRAVENOUS at 09:31

## 2021-09-08 RX ADMIN — PROMETHAZINE HYDROCHLORIDE 12.5 MG: 25 INJECTION INTRAMUSCULAR; INTRAVENOUS at 16:46

## 2021-09-08 RX ADMIN — HYDROMORPHONE HYDROCHLORIDE 0.5 MG: 2 INJECTION INTRAMUSCULAR; INTRAVENOUS; SUBCUTANEOUS at 13:05

## 2021-09-08 RX ADMIN — PHENYLEPHRINE HYDROCHLORIDE 200 MCG: 10 INJECTION INTRAVENOUS at 10:21

## 2021-09-08 RX ADMIN — LIDOCAINE HYDROCHLORIDE 1 MG/KG/HR: 8 INJECTION, SOLUTION INTRAVENOUS at 08:36

## 2021-09-08 RX ADMIN — HALOPERIDOL LACTATE 1 MG: 5 INJECTION, SOLUTION INTRAMUSCULAR at 13:43

## 2021-09-08 RX ADMIN — PHENYLEPHRINE HYDROCHLORIDE 100 MCG: 10 INJECTION INTRAVENOUS at 09:36

## 2021-09-08 RX ADMIN — PROPOFOL 200 MG: 10 INJECTION, EMULSION INTRAVENOUS at 08:14

## 2021-09-08 RX ADMIN — PHENYLEPHRINE HYDROCHLORIDE 200 MCG: 10 INJECTION INTRAVENOUS at 09:49

## 2021-09-08 RX ADMIN — GABAPENTIN 300 MG: 300 CAPSULE ORAL at 15:29

## 2021-09-08 RX ADMIN — ONDANSETRON 4 MG: 2 INJECTION INTRAMUSCULAR; INTRAVENOUS at 10:09

## 2021-09-08 RX ADMIN — PHENYLEPHRINE HYDROCHLORIDE 400 MCG: 10 INJECTION INTRAVENOUS at 10:03

## 2021-09-08 RX ADMIN — PHENYLEPHRINE HYDROCHLORIDE 200 MCG: 10 INJECTION INTRAVENOUS at 10:13

## 2021-09-08 RX ADMIN — VANCOMYCIN HYDROCHLORIDE 1500 MG: 10 INJECTION, POWDER, LYOPHILIZED, FOR SOLUTION INTRAVENOUS at 07:43

## 2021-09-08 RX ADMIN — APREPITANT 40 MG: 40 CAPSULE ORAL at 07:44

## 2021-09-08 RX ADMIN — KETAMINE HYDROCHLORIDE 15 MG: 50 INJECTION INTRAMUSCULAR; INTRAVENOUS at 09:05

## 2021-09-08 RX ADMIN — ATORVASTATIN CALCIUM 40 MG: 40 TABLET, FILM COATED ORAL at 21:54

## 2021-09-08 RX ADMIN — HYDROMORPHONE HYDROCHLORIDE 0.25 MG: 2 INJECTION INTRAMUSCULAR; INTRAVENOUS; SUBCUTANEOUS at 11:10

## 2021-09-08 RX ADMIN — ROCURONIUM BROMIDE 50 MG: 10 INJECTION, SOLUTION INTRAVENOUS at 08:14

## 2021-09-08 RX ADMIN — PHENYLEPHRINE HYDROCHLORIDE 200 MCG: 10 INJECTION INTRAVENOUS at 10:34

## 2021-09-08 RX ADMIN — Medication 3 AMPULE: at 08:03

## 2021-09-08 RX ADMIN — ROCURONIUM BROMIDE 5 MG: 10 INJECTION, SOLUTION INTRAVENOUS at 09:36

## 2021-09-08 RX ADMIN — Medication 3 MG: at 10:36

## 2021-09-08 RX ADMIN — PHENYLEPHRINE HYDROCHLORIDE 200 MCG: 10 INJECTION INTRAVENOUS at 08:23

## 2021-09-08 NOTE — PROGRESS NOTES
TRANSFER - IN REPORT:    Verbal report received from gilma(name) on Sanjay Dumont  being received from [facility]) for routine progression of care      Report consisted of patients Situation, Background, Assessment and   Recommendations(SBAR). Information from the following report(s) SBAR was reviewed with the receiving nurse. Opportunity for questions and clarification was provided. Assessment completed upon patients arrival to unit and care assumed.

## 2021-09-08 NOTE — BRIEF OP NOTE
Brief Postoperative Note    Patient: Eduarda Chung  YOB: 1960  MRN: [id number]    Date of Procedure: 9/8/2021     Pre-Op Diagnosis: Lumbar radiculopathy [M54.16]  Spinal stenosis of lumbar region with neurogenic claudication [M48.062]  Morbid obesity ([address] [E66.01]    Post-Op Diagnosis: SAME    Procedure(s):  L4-L5 SPINE TRANSFORAMINAL LUMBAR INTERBODY FUSION (TLIF)    Surgeon(s):  Ethel Taveras MD    Surgical Assistant: None    Anesthesia: General     Estimated Blood Loss (mL): 826 ML    Complications: NONE    Specimens:   ID Type Source Tests Collected by Time Destination   1 : L4-L5 disc material Preservative Disc Material  Ethel Taveras MD 9/8/2021 1003 Pathology        Implants:   Implant Name Type Inv.  Item Serial No.  Lot No. LRB No. Used Action   GRAFT BNE SUB 25CC 2MM GRAN ALLOGENIC MORPHOGENETIC PROT W - [id number]  GRAFT BNE SUB 25CC 2MM GRAN ALLOGENIC MORPHOGENETIC PROT W [id number]_WD  N/A 1 Implanted   cellerate    [id number]   N/A 1 Implanted       Drains: * No LDAs found *    Findings: STENOSIS    Electronically Signed by Pancho Ramos MD on 9/8/2021 at 10:24 AM

## 2021-09-08 NOTE — ANESTHESIA POSTPROCEDURE EVALUATION
Procedure(s):  L4-L5 SPINE TRANSFORAMINAL LUMBAR INTERBODY FUSION (TLIF). general    Anesthesia Post Evaluation      Multimodal analgesia: multimodal analgesia used between 6 hours prior to anesthesia start to PACU discharge  Patient location during evaluation: bedside  Patient participation: complete - patient participated  Level of consciousness: awake  Pain management: adequate  Airway patency: patent  Anesthetic complications: no  Cardiovascular status: acceptable  Respiratory status: spontaneous ventilation and acceptable  Hydration status: acceptable  Post anesthesia nausea and vomiting:  none      INITIAL Post-op Vital signs:   Vitals Value Taken Time   /56 09/08/21 1158   Temp 36.6 °C (97.9 °F) 09/08/21 1104   Pulse 82 09/08/21 1200   Resp 15 09/08/21 1158   SpO2 96 % 09/08/21 1200   Vitals shown include unvalidated device data.

## 2021-09-08 NOTE — PERIOP NOTES
TRANSFER - OUT REPORT:    Verbal report given to [facility] on Doug Ac  being transferred to [address] [place] [address] for routine progression of care       Report consisted of patients Situation, Background, Assessment and   Recommendations(SBAR). Information from the following report(s) Kardex, ED Summary, Procedure Summary, Intake/Output, MAR and Cardiac Rhythm sinus was reviewed with the receiving nurse. Lines:   Peripheral IV 09/08/21 Posterior;Right Hand (Active)   Site Assessment Clean, dry, & intact 09/08/21 1100   Phlebitis Assessment 0 09/08/21 1100   Infiltration Assessment 0 09/08/21 1100   Dressing Status Clean, dry, & intact 09/08/21 1100   Dressing Type Transparent;Tape 09/08/21 1100   Hub Color/Line Status Flushed; Infusing 09/08/21 1100       Peripheral IV 09/08/21 Left Arm (Active)   Site Assessment Clean, dry, & intact 09/08/21 1100   Phlebitis Assessment 0 09/08/21 1100   Infiltration Assessment 0 09/08/21 1100   Dressing Status Clean, dry, & intact 09/08/21 1100   Dressing Type Transparent;Tape 09/08/21 1100   Hub Color/Line Status Green;Capped 09/08/21 1100   Alcohol Cap Used No 09/08/21 1100        Opportunity for questions and clarification was provided. Patient transported with:   O2 @ 2 liters  Tech    VTE prophylaxis orders have been written for Doug Ac. Patient and family given floor number and nurses name. Family updated re: pt status after security code verified.

## 2021-09-08 NOTE — PROGRESS NOTES
09/08/21 1443   Dual Skin Pressure Injury Assessment   Dual Skin Pressure Injury Assessment WDL   Second Care Provider (Based on [address] [place] [address]) Emiliano Muñoz RN    Skin Integumentary   Skin Integumentary (WDL) X    Pressure  Injury Documentation No Pressure Injury Noted-Pressure Ulcer Prevention Initiated   Skin Color Appropriate for ethnicity   Skin Condition/Temp Dry; Warm   Skin Integrity Incision (comment); Abrasion  (lower mid back; scratch to face )

## 2021-09-08 NOTE — OP NOTES
[facility]  OPERATIVE REPORT    Name:  Jose Enrique Pimentel  MR#:  [id number]  :  1960  ACCOUNT #:  [de-identified]  DATE OF SERVICE:  2021    PREOPERATIVE DIAGNOSIS:  Spondylolisthesis and spinal stenosis, L4-5. POSTOPERATIVE DIAGNOSIS:  Spondylolisthesis and spinal stenosis, L4-5. PROCEDURE PERFORMED:  1. Left L4-5 laminectomy, facetectomy, foraminotomy and diskectomy. 2.  L4-5 transforaminal lumbar interbody fusion with Orthofix expandable cage, OsteoAMP, bone marrow aspirate, and autograft bone. 3.  Pedicle screw fusion, Orthofix, L4-5.  4.  Bone marrow aspiration, L4 vertebra. 5.  Morselized autograft harvest.  6.  Continuous intraoperative EMG monitoring. 7.  Continuous intraoperative fluoroscopy. SURGEON:  Anna Tejeda MD    ASSISTANT:  None. ANESTHESIA:  General endotracheal.    COMPLICATIONS:  None. SPECIMENS REMOVED:  L4-5 disk. IMPLANTS:  See below. ESTIMATED BLOOD LOSS:  200 mL    PREPARATION:  ChloraPrep. HISTORY OF PRESENT ILLNESS:  A 34-year-old lady with worsening back and leg pain refractory to conservative measures. MRI scanning was positive for spinal stenosis and possible spondylolisthesis at L4-5. Flexion and extension lumbar spine x-ray showed 3 mm of gross instability between flexion, neutral and extension at the L4-5 level. A stable spondylolisthesis is present at L5-S1. The patient is admitted for surgery as conservative measures have failed. She has bilateral symptoms, right greater than left. However, given her morbid obesity and body habitus, it was decided to approach the fusion and decompression from the left side, tunneling under the disk space and going over the top with bone resection and ligament removal to decompress both areas, and fuse from the left side forward. PROCEDURE:  The patient was brought to the operating room and was carefully placed under general endotracheal anesthesia without complications.   Jorge catheter, thigh-high pneumatic hose, RUFUS hose, and intraoperative EMG leads were placed on the legs. She was carefully turned prone on the You frame on top of the OSI bed. The posterior aspect of the back was shaved and prepped in usual sterile fashion. C-arm fluoroscopy was used. A midline incision was marked from L3-S1. The incision was made. Muscles and fascia were stripped in the left lateral subperiosteal plane with cautery and elevators and deep retractors were placed. Lateral C-arm fluoroscopy confirmed the correct localization of instruments pointing at L4-5. In the prone position, the listhesis reduced nicely. Next, left-sided laminectomy, facetectomy, foraminotomy and diskectomy were carried out with Leksell rongeurs and 3 and 4-mm Kerrison rongeurs. Significant bony and ligamentous hypertrophy were removed to decompress the dural sac from the nerve root to the midline. The dural sac and nerve root were mobilized medially. The disk space was opened with a 15-blade and cleaned with pituitary rongeurs, both straight biting and upbiting, as well as 6 through 12 side-biting curettes. Several large fragments were removed with an upbiting pituitary rongeur from the right side foramina. At this point, the disk space edges were cleaned with sharp scrapers. A cottonoid pledget was placed. The L4 pedicle was entered with a straight awl, curved monitoring probe, and a ball-tip probe. No EMG changes were noted to -20. The Jamshidi needle was placed through the pedicle into the vertebra. Using a 5-mL syringe, approximately 4 mL of bone marrow were aspirated and were mixed with OsteoAMP on the back table. The bone that was removed from the decompression was denuded of soft tissue, morselized into small pieces and mixed with the OsteoAMP and bone marrow aspirate.   A 6.5 x 50-mm Orthofix pedicle screw was placed at L4 through the pedicle into the vertebral body without any complications, confirmed by fluoroscopy to be in good position. EMG testing was negative to -20. Next, the nerve root and dural sac were mobilized medially. Again, the disk space was cleaned one final time with pituitary rongeurs. A bone gun was used and OsteoAMP, bone marrow aspirate and autograft bone were packed into the anterior disk space and impacted with a bone impactor. A 10-mm expandable Orthofix PEEK and titanium cage was then inserted from the left side across the midline to the right side to increase foraminal height restoration at that area. It was expanded to a height of 16 mm with good endplate contact superiorly and inferiorly and good placement of the cage was noted. Next, the L5 pedicle and screw were entered with a straight awl, curved monitoring probe, and a ball-tip probe. No EMG changes were noted. A 6.5 x 50-mm pedicle screw was placed through the pedicle and the vertebral body. AP and lateral fluoroscopy confirmed good trajectory and good position of the graft, slightly right of midline as planned. The L5 pedicle screw was in the inferior portion of the pedicle. Nevertheless, it was in bone and tested to -20 several times. Next, a 50-mm prebent leo was placed and Universal heads and locking caps were placed. The inferior one was tightened, and using a compressor, the L4 screw was compressed closer to the L5 screw and was locked in place with a screw cap and a locking screwdriver. A nice tight fit was achieved. The wound was copiously irrigated with sterile saline. Final x-rays were obtained which confirmed good position of the graft and hardware at L4-5. Surgiflo was placed for hemostasis. The wound was dry. A Dallas-Ortega drain was placed and brought through a stab incision inferiorly and secured with a 3-0 Vicryl suture, and finally, the wound was closed. The fascia was closed tightly with interrupted 0 Vicryl. Cellerate was placed for  wound healing.   Subcutaneous tissues were closed with interrupted 3-0 Vicryl. Skin was closed with small staples. Pressure dressings were placed. The drain was hooked to a bulb suction. The patient tolerated the procedure well, was turned supine, awakened, extubated, taken to PACU in stable condition. There were no obvious complications.       MD RODRIGO [id number]  D:  09/08/2021 10:38  T:  09/08/2021 14:04  JOB #:  [id number]

## 2021-09-08 NOTE — PROGRESS NOTES
ACUTE PHYSICAL THERAPY GOALS:  (Developed with and agreed upon by patient and/or caregiver. )    LTG:  (1.)Ms. Iraida Hector will move from supine to sit and sit to supine , scoot up and down and roll side to side in bed with INDEPENDENT within 7 treatment day(s). (2.)Ms. Iraida Hector will transfer from bed to chair and chair to bed with MODIFIED INDEPENDENCE using the least restrictive device within 7 treatment day(s). (3.)Ms. Iraida Hector will ambulate with MODIFIED INDEPENDENCE for 300 feet with the least restrictive device within 7 treatment day(s). (4.)Ms. Iraida Hector will tolerate at least 23 min of dynamic standing activity to assist standing ADLs with the least restrictive device within 7 treatment days. (5.)Ms. Iraida Hector will climb at least 6 steps with MODIFIED INDEPENDENCE with the least restrictive device within 7 treatment days.     ________________________________________________________________________________________________        PHYSICAL THERAPY ASSESSMENT: Initial Assessment, Daily Note and PM PT Treatment Day # 1 SPINAL PRECAUTIONS      Capri Saha is a 63 y.o. female   PRIMARY DIAGNOSIS: <principal problem not specified>  Lumbar radiculopathy [M54.16]  Spinal stenosis of lumbar region with neurogenic claudication [M48.062]  Morbid obesity ([address] [E66.01]  Procedure(s) (LRB):  L4-L5 SPINE TRANSFORAMINAL LUMBAR INTERBODY FUSION (TLIF) (N/A)  Day of Surgery  Reason for Referral:    ICD-10: Treatment Diagnosis: Generalized Muscle Weakness (M62.81)  Other lack of cordination (R27.8)  Difficulty in walking, Not elsewhere classified (R26.2)  Other abnormalities of gait and mobility (R26.89)  Dizziness and Giddiness (R42)  Unspecified Lack of Coordination (R27.9)  OBSERVATION: Payor: /     ASSESSMENT:     REHAB RECOMMENDATIONS:   Recommendation to date pending progress:  [address] [place] [address] Therapy  Equipment:    Rolling Walker     PRIOR LEVEL OF FUNCTION:  (Prior to Hospitalization) INITIAL/CURRENT LEVEL OF FUNCTION:  (Most Recently Demonstrated)   Bed Mobility:   Independent  Sit to Stand:   Independent  Transfers:   Independent  Gait/Mobility:   Independent Bed Mobility:   Minimal Assistance x 2  Sit to Stand:   Minimal Assistance x 2  Transfers:   Contact Guard Assistance x 2  Gait/Mobility:   Minimal Assistance x 2     ASSESSMENT:  Ms. Keyshawn Dockery presents in supine, A&Ox4. Upon entering, Pnt is agreeable to PT treatment. she reports 4/10 pain in her back at rest. Pnt performed supine > sit with min Ax2 via log roll, sitting EOB with good sitting balance control. BP is taken at EOB and standing and is normaltensive. Sit > stand with min Ax2 using RW. Gait x 6 ft with min A, cues for step length and close proximity to RW. Gait is noted to be slow and shuffled. Stand > sit with min A, followed by positioning for comfort. At end of session pt up in bedside chair with all needs within reach, alarm activated for safety, RN notified. Overall, she present as functioning below her baseline, with deficits in mobility including transfers, gait, balance, and activity tolerance. Pt will continue to benefit from skilled therapy services to address stated deficits to promote return to highest level of function, independence, and safety. Will continue to follow.          SUBJECTIVE:   Ms. Keyshawn Dockery states, \"I'm a .\"    SOCIAL HISTORY/LIVING ENVIRONMENT: lives w/  in 1 story home w/ 6 COURTNEY, no DME at baseline     OBJECTIVE:     PAIN: VITAL SIGNS: LINES/DRAINS:   Pre Treatment: Pain Screen  Pain Scale 1: Numeric (0 - 10)  Pain Intensity 1: 4  Pain Location 1: Back  Post Treatment: 4   IV and CHASITY Drain  O2 Device: Nasal cannula     GROSS EVALUATION:   Within Functional Limits Abnormal/ Functional Abnormal/ Non-Functional (see comments) Not Tested Comments:   AROM [] [x] [] [] Spinal precautions    PROM [] [x] [] []    Strength [] [x] [] []    Balance [] [x] [] []    Posture [] [x] [] []    Sensation [x] [] [] []    Coordination [x] [] [] []    Tone [x] [] [] []    Edema [x] [] [] []    Activity Tolerance [x] [] [] []     [] [] [] []      COGNITION/  PERCEPTION: Intact Impaired   (see comments) Comments:   Orientation [x] []    Vision [x] []    Hearing [x] []    Command Following [x] []    Safety Awareness [x] []     [] []      MOBILITY: I Mod I S SBA CGA Min Mod Max Total  NT x2 Comments:   Bed Mobility    Rolling [] [] [] [] [] [x] [] [] [] [] [x]    Supine to Sit [] [] [] [] [] [x] [] [] [] [] [x] Via log roll   Scooting [] [] [] [] [] [x] [] [] [] [] []    Sit to Supine [] [] [] [] [] [] [] [] [] [x] []    Transfers    Sit to Stand [] [] [] [] [] [x] [] [] [] [] [x]    Bed to Chair [] [] [] [] [] [x] [] [] [] [] []    Stand to Sit [] [] [] [] [] [x] [] [] [] [] [x]    I=Independent, Mod I=Modified Independent, S=Supervision, SBA=Standby Assistance, CGA=Contact Guard Assistance,   Min=Minimal Assistance, Mod=Moderate Assistance, Max=Maximal Assistance, Total=Total Assistance, NT=Not Tested  GAIT: I Mod I S SBA CGA Min Mod Max Total  NT x2 Comments:   Level of Assistance [] [] [] [] [] [x] [] [] [] [] [x]    Distance x6'    DME Rolling Walker and Gait Belt    Gait Quality slow    Weightbearing Status N/A     I=Independent, Mod I=Modified Independent, S=Supervision, SBA=Standby Assistance, CGA=Contact Guard Assistance,   Min=Minimal Assistance, Mod=Moderate Assistance, Max=Maximal Assistance, Total=Total Assistance, NT=Not Tested    [address] [facility] [address] [address] [facility] [address] [facility] Mobility Inpatient Short Form       How much difficulty does the patient currently have. .. Unable A Lot A Little None   1. Turning over in bed (including adjusting bedclothes, sheets and blankets)? [] 1   [] 2   [x] 3   [] 4   2. Sitting down on and standing up from a chair with arms ( e.g., wheelchair, bedside commode, etc.)   [] 1   [] 2   [x] 3   [] 4   3. Moving from lying on back to sitting on the side of the bed?    [] 1   [] 2 [x] 3   [] 4   How much help from another person does the patient currently need. .. Total A Lot A Little None   4. Moving to and from a bed to a chair (including a wheelchair)? [] 1   [] 2   [x] 3   [] 4   5. Need to walk in hospital room? [] 1   [] 2   [x] 3   [] 4   6. Climbing 3-5 steps with a railing? [] 1   [x] 2   [] 3   [] 4   © 2007, Trustees of [address] [place] [address] [address] [address], under license to [employer]. All rights reserved     Score:  Initial: 17 Most Recent: X (Date: -- )    Interpretation of Tool:  Represents activities that are increasingly more difficult (i.e. Bed mobility, Transfers, Gait). PLAN:   FREQUENCY/DURATION: PT Plan of Care: BID for duration of hospital stay or until stated goals are met, whichever comes first.    PROBLEM LIST:   (Skilled intervention is medically necessary to address:)  1. Decreased ADL/Functional Activities  2. Decreased Activity Tolerance  3. Decreased AROM/PROM  4. Decreased Balance  5. Decreased Coordination  6. Decreased Gait Ability  7. Decreased Strength  8. Decreased Transfer Abilities  9. Increased Pain   INTERVENTIONS PLANNED:   (Benefits and precautions of physical therapy have been discussed with the patient.)  1. Therapeutic Activity  2. Therapeutic Exercise/HEP  3. Neuromuscular Re-education  4. Gait Training  5. Manual Therapy  6. Education     TREATMENT:     EVALUATION: Low Complexity : (Untimed Charge)    TREATMENT:   (     )  Therapeutic Activity (10 Minutes): Therapeutic activity included Rolling, Supine to Sit, Scooting, Transfer Training, Ambulation on level ground, Sitting balance  and Standing balance to improve functional Mobility, Strength, ROM and Activity tolerance.     TREATMENT GRID:  N/A    AFTER TREATMENT POSITION/PRECAUTIONS:  Chair, Needs within reach and RN notified    INTERDISCIPLINARY COLLABORATION:  RN/PCT, PT/PTA and Rehab Attendant     TOTAL TREATMENT DURATION:  PT Patient Time In/Time Out  Time In: 8919  Time Out: 26 Leah Rubin Abraham Wilson

## 2021-09-08 NOTE — H&P
[address] [place] [address]  HISTORY AND PHYSICAL    Name:  Bhavin Tam  MR#:  [id number]  :  1960  ACCOUNT #:  [de-identified]  ADMIT DATE:  2021      CHIEF COMPLAINT:  Lower extremity pain x months. HISTORY OF PRESENT ILLNESS:  A 57-year-old lady with a multi-month history of back pain and right lower extremity pain refractory to conservative measures. MRI scanning was positive for spinal stenosis centrally and in the right at L4-5. Flexion and extension lumbar spine x-rays showed gross instability at L4-5 and a stable amount of listhesis that L5-S1. The patient failed aggressive conservative measures and was admitted for surgery. ALLERGIES:  CODEINE, LORTAB, AND MORPHINE. PAST MEDICAL HISTORY:  1. Rhinitis. 2.  Morbid obesity. 3.  Hypertension. 4.  Migraine. FAMILY HISTORY:  Positive for cancer, hypertension, diabetes, and kidney disease. SOCIAL HISTORY:  She is a former smoker but she quit three years ago. Minimal ethanol consumer. She is . REVIEW OF SYSTEMS:  Negative for shortness of breath or chest pain. MEDICATIONS:  Listed on the universal medication form attached to the chart. PHYSICAL EXAMINATION:  GENERAL:  Normal except for morbid obesity. HEENT:  Unremarkable. Nose and throat clear. CHEST:  Clear bilaterally. CARDIAC:  Regular rate and rhythm. No murmurs or gallops. ABDOMEN:  Soft, benign, nontender. No masses. Bowel sounds positive. EXTREMITIES:  Free of deformities. NEUROLOGIC:  Awake, alert, oriented x3. Cranial nerves II through XII are intact. Motor strength 5/5 except for the right EHL and dorsiflexion, both weak at 4/5. Reflexes symmetric. Gait antalgic. No tremor. IMPRESSION:  Spinal stenosis and spondylolisthesis, L4-5. Lumbar radiculopathy. Failure of conservative measures. PLAN:  L4-5 transforaminal lumbar interbody fusion.   The risks were thoroughly explained and include bleeding, infection, weakness, numbness, persistent pain, CSF leak, nonunion, paralysis, death, and worsening weakness. The risks are much higher because of her morbid obesity. She understands and agrees to proceed.         MD RODRIGO Downing/[id number]  D:  09/08/2021 8:20  T:  09/08/2021 10:36  JOB #:  [id number]

## 2021-09-08 NOTE — ANESTHESIA PREPROCEDURE EVALUATION
Anesthetic History   No history of anesthetic complications            Review of Systems / Medical History  Patient summary reviewed and pertinent labs reviewed    Pulmonary  Within defined limits                 Neuro/Psych         Headaches (Migraines about 1x/month) and psychiatric history (Anxiety)     Cardiovascular    Hypertension: well controlled          Hyperlipidemia    Exercise tolerance: >4 METS     GI/Hepatic/Renal     GERD: well controlled           Endo/Other        Morbid obesity     Other Findings              Physical Exam    Airway  Mallampati: II  TM Distance: 4 - 6 cm  Neck ROM: normal range of motion, short neck   Mouth opening: Normal     Cardiovascular  Regular rate and rhythm,  S1 and S2 normal,  no murmur, click, rub, or gallop          Pertinent negatives: No murmur   Dental    Dentition: Caps/crowns     Pulmonary  Breath sounds clear to auscultation               Abdominal  GI exam deferred       Other Findings            Anesthetic Plan    ASA: 3  Anesthesia type: general          Induction: Intravenous  Anesthetic plan and risks discussed with: Patient

## 2021-09-09 VITALS
SYSTOLIC BLOOD PRESSURE: 111 MMHG | RESPIRATION RATE: 22 BRPM | HEART RATE: 80 BPM | OXYGEN SATURATION: 95 % | TEMPERATURE: 97.6 F | DIASTOLIC BLOOD PRESSURE: 70 MMHG

## 2021-09-09 PROCEDURE — 99218 HC RM OBSERVATION: CPT

## 2021-09-09 PROCEDURE — 97535 SELF CARE MNGMENT TRAINING: CPT

## 2021-09-09 PROCEDURE — 74011250637 HC RX REV CODE- 250/637: Performed by: NEUROLOGICAL SURGERY

## 2021-09-09 PROCEDURE — 74011250636 HC RX REV CODE- 250/636: Performed by: NEUROLOGICAL SURGERY

## 2021-09-09 PROCEDURE — 97165 OT EVAL LOW COMPLEX 30 MIN: CPT

## 2021-09-09 PROCEDURE — 97530 THERAPEUTIC ACTIVITIES: CPT

## 2021-09-09 PROCEDURE — 2709999900 HC NON-CHARGEABLE SUPPLY

## 2021-09-09 RX ORDER — OXYCODONE AND ACETAMINOPHEN 7.5; 325 MG/1; MG/1
1 TABLET ORAL
Qty: 28 TABLET | Refills: 0 | Status: SHIPPED | OUTPATIENT
Start: 2021-09-09 | End: 2021-09-16

## 2021-09-09 RX ORDER — CEFUROXIME AXETIL 500 MG/1
500 TABLET ORAL 2 TIMES DAILY
Qty: 14 TABLET | Refills: 0 | Status: SHIPPED | OUTPATIENT
Start: 2021-09-09 | End: 2021-09-22 | Stop reason: ALTCHOICE

## 2021-09-09 RX ADMIN — OXYCODONE HYDROCHLORIDE AND ACETAMINOPHEN 1 TABLET: 10; 325 TABLET ORAL at 12:05

## 2021-09-09 RX ADMIN — ESTRADIOL 1 MG: 1 TABLET ORAL at 09:46

## 2021-09-09 RX ADMIN — TRIAMTERENE AND HYDROCHLOROTHIAZIDE 1 TABLET: 37.5; 25 TABLET ORAL at 09:46

## 2021-09-09 RX ADMIN — VANCOMYCIN HYDROCHLORIDE 1500 MG: 10 INJECTION, POWDER, LYOPHILIZED, FOR SOLUTION INTRAVENOUS at 09:51

## 2021-09-09 RX ADMIN — LISINOPRIL 5 MG: 5 TABLET ORAL at 09:46

## 2021-09-09 RX ADMIN — OXYCODONE HYDROCHLORIDE AND ACETAMINOPHEN 1 TABLET: 10; 325 TABLET ORAL at 03:09

## 2021-09-09 RX ADMIN — GABAPENTIN 300 MG: 300 CAPSULE ORAL at 09:46

## 2021-09-09 RX ADMIN — PANTOPRAZOLE SODIUM 40 MG: 40 TABLET, DELAYED RELEASE ORAL at 09:46

## 2021-09-09 RX ADMIN — Medication 1 AMPULE: at 09:46

## 2021-09-09 RX ADMIN — ATENOLOL 50 MG: 50 TABLET ORAL at 09:46

## 2021-09-09 NOTE — PROGRESS NOTES
ACUTE PHYSICAL THERAPY GOALS:  (Developed with and agreed upon by patient and/or caregiver. )  LTG:  (1.)Ms. Reza Sotelo will move from supine to sit and sit to supine , scoot up and down and roll side to side in bed with INDEPENDENT within 7 treatment day(s). (2.)Ms. Reza Sotelo will transfer from bed to chair and chair to bed with MODIFIED INDEPENDENCE using the least restrictive device within 7 treatment day(s). (3.)Ms. Reza Sotelo will ambulate with MODIFIED INDEPENDENCE for 300 feet with the least restrictive device within 7 treatment day(s). (4.)Ms. Reza Sotelo will tolerate at least 23 min of dynamic standing activity to assist standing ADLs with the least restrictive device within 7 treatment days. (5.)Ms. Reza Sotelo will climb at least 6 steps with MODIFIED INDEPENDENCE with the least restrictive device within 7 treatment days. PHYSICAL THERAPY: Daily Note and AM Treatment Day # 1    Sheilah Oppenheim is a 63 y.o. female   PRIMARY DIAGNOSIS: <principal problem not specified>  Lumbar radiculopathy [M54.16]  Spinal stenosis of lumbar region with neurogenic claudication [M48.062]  Morbid obesity ([facility] [address] [E66.01]  Procedure(s) (LRB):  L4-L5 SPINE TRANSFORAMINAL LUMBAR INTERBODY FUSION (TLIF) (N/A)  1 Day Post-Op    ASSESSMENT:     REHAB RECOMMENDATIONS: CURRENT LEVEL OF FUNCTION:  (Most Recently Demonstrated)   Recommendation to date pending progress:  [address] [place] [address]  Equipment:    To Be Determined Bed Mobility:   Not tested  Sit to Stand:   Modified Independent  Transfers:   Standby Assistance  Gait/Mobility:   Contact Guard Assistance     ASSESSMENT:  Ms. Reza Sotelo is sitting on side of bed on contact. She stood and was able to increase her ambulation to 250' with CGA. She seems a little groggy/sleepy but is moving better. Reviewed spinal precautions and log rolling with pt. She was left sitting up in chair with needs in reach.   She is making good progress towards goals and is looking to go home later today . SUBJECTIVE:   Ms. Helga Dakins is with no complaints.     SOCIAL HISTORY/ LIVING ENVIRONMENT: lives w/  in 1 story home w/ 6 COURTNEY, no DME at baseline  Home Environment: Private residence  One/Two Story Residence: One story  Living Alone: No  Support Systems: Spouse/Significant Other  OBJECTIVE:     PAIN: VITAL SIGNS: LINES/DRAINS:   Pre Treatment: Pain Screen  Pain Scale 1: Numeric (0 - 10)  Pain Intensity 1: 0  Post Treatment: 0   IV  O2 Device: Nasal cannula     MOBILITY: I Mod I S SBA CGA Min Mod Max Total  NT x2 Comments:   Bed Mobility    Rolling [] [] [] [] [] [] [] [] [] [] []    Supine to Sit [] [] [] [] [] [] [] [] [] [] []    Scooting [] [] [] [x] [] [] [] [] [] [] []    Sit to Supine [] [] [] [] [] [] [] [] [] [] []    Transfers    Sit to Stand [] [x] [] [] [] [] [] [] [] [] []    Bed to Chair [] [] [] [x] [] [] [] [] [] [] []    Stand to Sit [] [x] [] [] [] [] [] [] [] [] []    I=Independent, Mod I=Modified Independent, S=Supervision, SBA=Standby Assistance, CGA=Contact Guard Assistance,   Min=Minimal Assistance, Mod=Moderate Assistance, Max=Maximal Assistance, Total=Total Assistance, NT=Not Tested    BALANCE: Good Fair+ Fair Fair- Poor NT Comments   Sitting Static [] [] [] [] [] []    Sitting Dynamic [] [] [] [] [] []              Standing Static [] [] [] [] [] []    Standing Dynamic [] [] [] [] [] []      GAIT: I Mod I S SBA CGA Min Mod Max Total  NT x2 Comments:   Level of Assistance [] [] [] [] [x] [] [] [] [] [] []    Distance 250'    DME None    Gait Quality Short, somewhat guarded steps    Weightbearing  Status N/A     I=Independent, Mod I=Modified Independent, S=Supervision, SBA=Standby Assistance, CGA=Contact Guard Assistance,   Min=Minimal Assistance, Mod=Moderate Assistance, Max=Maximal Assistance, Total=Total Assistance, NT=Not Tested    PLAN:   FREQUENCY/DURATION: PT Plan of Care: BID for duration of hospital stay or until stated goals are met, whichever comes first.  TREATMENT:     TREATMENT:   (     )  Therapeutic Activity (27 Minutes): Therapeutic activity included Scooting, Ambulation on level ground, Standing balance and sit to stand to improve functional Mobility, Strength and Activity tolerance.     TREATMENT GRID:  N/A    AFTER TREATMENT POSITION/PRECAUTIONS:  Chair, Needs within reach and RN notified    INTERDISCIPLINARY COLLABORATION:  RN/PCT and PT/PTA    TOTAL TREATMENT DURATION:  PT Patient Time In/Time Out  Time In: 0916  Time Out: 135[address] [facility]

## 2021-09-09 NOTE — PROGRESS NOTES
Pt has been discharged home. Hourly rounds completed. Pain medicine given per MAR. Pt's  took her home. Dressing to lower back changed with gauze and tegerderm. Additional dressing supplies given to pt per pt request. Teaching provided to pt for dressing change. Pt told not to soak dressing until her follow up appointment. PIV removed with no bleeding. Pt had some controlled meds in her bag in the room. This RN and another RN took these meds Ambien, Gabapentin and Ativan and put them in the lock box. These meds were returned back to pt with complete counts at discharge. Discharge paperwork and instructions handed to pt and reviewed with pt. Opportunity for questions provided. Pt denies needs at this time. All needs met at this time.

## 2021-09-09 NOTE — PROGRESS NOTES
ACUTE OT GOALS:  (Developed with and agreed upon by patient and/or caregiver.)  1. Patient will complete lower body bathing and dressing with Mod I and adaptive equipment as needed. 2. Patient will complete toileting with Mod I and adaptive equipment as needed. 3. Patient will tolerate 23 minutes of OT treatment with 1-2 rest breaks to increase activity tolerance for ADLs. 4. Patient will complete functional transfers with Mod I and adaptive equipment as needed. 5. Patient will complete standing grooming ADL with Mod I and good dynamic standing balance. 6. Patient will verbalize and demonstrate post-operative spinal precautions with 100% accuracy during ADL performance. Timeframe: 7 visits       OCCUPATIONAL THERAPY ASSESSMENT: Initial Assessment, Daily Note and AM OT Treatment Day # 1   Spinal Precautions    Katya Deutsch is a 63 y.o. female   PRIMARY DIAGNOSIS: <principal problem not specified>  Lumbar radiculopathy [M54.16]  Spinal stenosis of lumbar region with neurogenic claudication [M48.062]  Morbid obesity ([facility] [address] [E66.01]  Procedure(s) (LRB):  L4-L5 SPINE TRANSFORAMINAL LUMBAR INTERBODY FUSION (TLIF) (N/A)  1 Day Post-Op  Reason for Referral:  Post-op mobilization  ICD-10: Treatment Diagnosis: Generalized Muscle Weakness (M62.81)  Difficulty in walking, Not elsewhere classified (R26.2)  OBSERVATION: Payor: /   ASSESSMENT:     REHAB RECOMMENDATIONS:   Recommendation to date pending progress:  [address] [place] [address] Therapy  Equipment:    3 in 1 Bedside Commode to be used as SC to increase independence, safety, and activity tolerance for bathing.      PRIOR LEVEL OF FUNCTION:  (Prior to Hospitalization)  INITIAL/CURRENT LEVEL OF FUNCTION:  (Based on today's evaluation)   Bathing:   Independent  Dressing:   Independent  Feeding/Grooming:   Independent  Toileting:   Independent  Functional Mobility:   Independent Bathing:   Minimal Assistance  Dressing:   Moderate Assistance lower body dressing. Feeding/Grooming:   Standby Assistance at sinkside. Toileting:   Minimal Assistance  Functional Mobility:   Contact Guard Assistance no use of DME. ASSESSMENT:  Ms. Jayla Watson was admitted for lumbar radiculopathy and spinal stenosis of lumbar region and is now s/p L4-L5 fusion. Presents with deficits in overall strength, balance, and activity tolerance for performance of ADLs and functional mobility. Requires CGA with use of log roll technique to transfer to edge of bed. Seated edge of bed, static seated balance intact. Requires CGA without use of DME to complete sit <> stand and CGA to walk from bed to [facility]. Requires CGA to complete clothing management in preparation for toileting. Requires CGA to complete stand to sit on BSC. Following urination, requires CGA to complete sit <> stand and Min A for standing balance to complete clothing management. Requires CGA to walk from [facility] to sink and SBA to wash hands with no loss of standing balance observed. Requires CGA to return to recliner chair and complete stand to sit. Pt able to verbalize post-op spinal precautions with 100% accuracy, but would benefit from reminders in future therapy sessions for improved demonstration during ADL performance. Pt would benefit from continued skilled OT to increase independence and safety for performance of ADLs and functional mobility. SUBJECTIVE:   Ms. Jayla Watson states, \"It hurts when I move. \" In reference to increased back pain with mobility. SOCIAL HISTORY/LIVING ENVIRONMENT: Lives with spouse in one level home with 6 COURTNEY ([facility]). Independent for ADLs and functional mobility.  Works as a .  Home Environment: Private residence  [address] [place] [address] St: One story  Living Alone: No  Support Systems: Spouse/Significant Other    OBJECTIVE:     PAIN: VITAL SIGNS: LINES/DRAINS:   Pre Treatment: Pain Screen  Pain Scale 1: Numeric (0 - 10)  Pain Intensity 1: 0  Post Treatment: Unchanged at end of session. Does report increased pain with functional mobility. CHASITY Drain  O2 Device: Nasal cannula     GROSS EVALUATION:  BUE Within Functional Limits Abnormal/ Functional Abnormal/ Non-Functional (see comments) Not Tested Comments:   AROM [x] [] [] []    PROM [] [] [] [x]    Strength [x] [] [] []    Balance [] [x] [] [] Minimally decreased. Posture [] [x] [] []    Sensation [x] [] [] []    Coordination [x] [] [] []    Tone [x] [] [] []    Edema [x] [] [] []    Activity Tolerance [] [x] [] []     [] [] [] []      COGNITION/  PERCEPTION: Intact Impaired   (see comments) Comments:   Orientation [x] []    Vision [x] []    Hearing [x] []    Judgment/ Insight [x] []    Attention [x] []    Memory [x] []    Command Following [x] []    Emotional Regulation [] [x] Pt tearful with mobility due to increased pain.    [] []      ACTIVITIES OF DAILY LIVING: I Mod I S SBA CGA Min Mod Max Total NT Comments   BASIC ADLs:              Bathing/ Showering [] [] [] [] [] [] [] [] [] [x]    Toileting [] [] [] [] [] [x] [] [] [] [] Clothing management. Dressing [] [] [] [] [] [] [] [] [x] [] Sock management   Feeding [] [] [] [] [] [] [] [] [] [x]    Grooming [] [] [] [x] [] [] [] [] [] [] Wash hands at sinkside. Personal Device Care [] [] [] [] [] [] [] [] [] [x]    Functional Mobility [] [] [] [] [x] [] [] [] [] [] Transfers/gait   I=Independent, Mod I=Modified Independent, S=Supervision, SBA=Standby Assistance, CGA=Contact Guard Assistance,   Min=Minimal Assistance, Mod=Moderate Assistance, Max=Maximal Assistance, Total=Total Assistance, NT=Not Tested    MOBILITY: I Mod I S SBA CGA Min Mod Max Total  NT x2 Comments:   Supine to sit [] [] [] [] [x] [] [] [] [] [] [] With use of log roll   Sit to supine [] [] [] [] [] [] [] [] [] [x] []    Sit to stand [] [] [] [] [x] [] [] [] [] [] []    Bed to chair [] [] [] [] [x] [] [] [] [] [] [] No use of DME.    I=Independent, Mod I=Modified Independent, S=Supervision, SBA=Standby Assistance, CGA=Contact Guard Assistance,   Min=Minimal Assistance, Mod=Moderate Assistance, Max=Maximal Assistance, Total=Total Assistance, NT=Not [facility] 6 Clicks   Daily Activity Inpatient Short Form        How much help from another person does the patient currently need. .. Total A Lot A Little None   1. Putting on and taking off regular lower body clothing? [] 1   [x] 2   [] 3   [] 4   2. Bathing (including washing, rinsing, drying)? [] 1   [] 2   [x] 3   [] 4   3. Toileting, which includes using toilet, bedpan or urinal?   [] 1   [] 2   [x] 3   [] 4   4. Putting on and taking off regular upper body clothing? [] 1   [] 2   [x] 3   [] 4   5. Taking care of personal grooming such as brushing teeth? [] 1   [] 2   [x] 3   [] 4   6. Eating meals? [] 1   [] 2   [] 3   [x] 4   © 2007, Trustees of [address] [place] [address], under license to [employer]. All rights reserved     Score:  Initial: 18, completed, 9/9/2021 Most Recent: X (Date: -- )   Interpretation of Tool:  Represents activities that are increasingly more difficult (i.e. Bed mobility, Transfers, Gait). PLAN:   FREQUENCY/DURATION: OT Plan of Care: 3 times/week for duration of hospital stay or until stated goals are met, whichever comes first.    PROBLEM LIST:   (Skilled intervention is medically necessary to address:)  1. Decreased ADL/Functional Activities  2. Decreased Activity Tolerance  3. Decreased Balance  4. Decreased Gait Ability  5. Decreased Transfer Abilities  6. Increased Pain   INTERVENTIONS PLANNED:   (Benefits and precautions of occupational therapy have been discussed with the patient.)  1. Self Care Training  2. Therapeutic Activity  3. Therapeutic Exercise/HEP  4. Neuromuscular Re-education  5.  Education     TREATMENT:     EVALUATION: Low Complexity : (Untimed Charge)    TREATMENT:   Self Care (10 Minutes): Self care including Toileting, Lower Body Dressing and Grooming to increase independence and decrease level of assistance required. TREATMENT GRID:  N/A    AFTER TREATMENT POSITION/PRECAUTIONS:  Chair, Needs within reach, RN notified and table tray anterior to pt.     INTERDISCIPLINARY COLLABORATION:  RN/PCT, PT/PTA and OT/VELÁSQUEZ    TOTAL TREATMENT DURATION:  OT Patient Time In/Time Out  Time In: 1130  Time Out: [facility] RAMIREZ Nina/L

## 2021-09-09 NOTE — PROGRESS NOTES
NS  POD # 1  AFEBRILE  DRY DRESSINGS  5/5 POWER  LEG PAIN  IS  BETTER  CHASITY: 150  ML  A/P  OT+PT  TD Lisa Pro  MD

## 2021-09-09 NOTE — DISCHARGE INSTRUCTIONS
Patient Education        Learning About Relief for Back Pain  What is back tension and strain? Back strain happens when you overstretch, or pull, a muscle in your back. You may hurt your back in an accident or when you exercise or lift something. Most back pain will get better with rest and time. You can take care of yourself at home to help your back heal.  What can you do first to relieve back pain? When you first feel back pain, try these steps:  · Walk. Take a short walk (10 to 20 minutes) on a level surface (no slopes, hills, or stairs) every 2 to 3 hours. Walk only distances you can manage without pain, especially leg pain. · Relax. Find a comfortable position for rest. Some people are comfortable on the floor or a medium-firm bed with a small pillow under their head and another under their knees. Some people prefer to lie on their side with a pillow between their knees. Don't stay in one position for too long. · Try heat or ice. Try using a heating pad on a low or medium setting, or take a warm shower, for 15 to 20 minutes every 2 to 3 hours. Or you can buy single-use heat wraps that last up to 8 hours. You can also try an ice pack for 10 to 15 minutes every 2 to 3 hours. You can use an ice pack or a bag of frozen vegetables wrapped in a thin towel. There is not strong evidence that either heat or ice will help, but you can try them to see if they help. You may also want to try switching between heat and cold. · Take pain medicine exactly as directed. ¨ If the doctor gave you a prescription medicine for pain, take it as prescribed. ¨ If you are not taking a prescription pain medicine, ask your doctor if you can take an over-the-counter medicine. What else can you do? · Stretch and exercise. Exercises that increase flexibility may relieve your pain and make it easier for your muscles to keep your spine in a good, neutral position. And don't forget to keep walking. · Do self-massage.  You can use self-massage to unwind after work or school or to energize yourself in the morning. You can easily massage your feet, hands, or neck. Self-massage works best if you are in comfortable clothes and are sitting or lying in a comfortable position. Use oil or lotion to massage bare skin. · Reduce stress. Back pain can lead to a vicious [place]: Distress about the pain tenses the muscles in your back, which in turn causes more pain. Learn how to relax your mind and your muscles to lower your stress. Where can you learn more? Go to http://www.gray.com/. Enter M773 in the search box to learn more about \"Learning About Relief for Back Pain. \"  Current as of: March 21, 2017  Content Version: 11.5  © 7901-3218 [employer] Care instructions adapted under license by [employer] (which disclaims liability or warranty for this information). If you have questions about a medical condition or this instruction, always ask your healthcare professional. [place] [address] any warranty or liability for your use of this information. Patient Education        Learning About Relief for Back Pain  What is back strain? Back strain is an injury that happens when you overstretch, or pull, a muscle in your back. You may hurt your back in an accident or when you exercise or lift something. Most back pain gets better with rest and time. You can take care of yourself at home to help your back heal.  What can you do first to relieve back pain? When you first feel back pain, try these steps:  · Walk. Take a short walk (10 to 20 minutes) on a level surface (no slopes, hills, or stairs) every 2 to 3 hours. Walk only distances you can manage without pain, especially leg pain. · Relax. Find a comfortable position for rest. Some people are comfortable on the floor or a medium-firm bed with a small pillow under their head and another under their knees.  Some people prefer to lie on their side with a pillow between their knees. Don't stay in one position for too long. · Try heat or ice. Try using a heating pad on a low or medium setting, or take a warm shower, for 15 to 20 minutes every 2 to 3 hours. Or you can buy single-use heat wraps that last up to 8 hours. You can also try an ice pack for 10 to 15 minutes every 2 to 3 hours. You can use an ice pack or a bag of frozen vegetables wrapped in a thin towel. There is not strong evidence that either heat or ice will help, but you can try them to see if they help. You may also want to try switching between heat and cold. · Take pain medicine exactly as directed. ? If the doctor gave you a prescription medicine for pain, take it as prescribed. ? If you are not taking a prescription pain medicine, ask your doctor if you can take an over-the-counter medicine. What else can you do? · Stretch and exercise. Exercises that increase flexibility may relieve your pain and make it easier for your muscles to keep your spine in a good, neutral position. And don't forget to keep walking. · Do self-massage. You can use self-massage to unwind after work or school or to energize yourself in the morning. You can easily massage your feet, hands, or neck. Self-massage works best if you are in comfortable clothes and are sitting or lying in a comfortable position. Use oil or lotion to massage bare skin. · Reduce stress. Back pain can lead to a vicious [place]: Distress about the pain tenses the muscles in your back, which in turn causes more pain. Learn how to relax your mind and your muscles to lower your stress. Where can you learn more? Go to http://www.gray.com/  Enter Q517 in the search box to learn more about \"Learning About Relief for Back Pain. \"  Current as of: November 16, 2020               Content Version: 12.8  © 2006-2021 [employer].    Care instructions adapted under license by [employer] (which disclaims liability or warranty for this information). If you have questions about a medical condition or this instruction, always ask your healthcare professional. [address] any warranty or liability for your use of this information. Patient Education        Learning About Relief for Back Pain  What is back strain? Back strain is an injury that happens when you overstretch, or pull, a muscle in your back. You may hurt your back in an accident or when you exercise or lift something. Most back pain gets better with rest and time. You can take care of yourself at home to help your back heal.  What can you do first to relieve back pain? When you first feel back pain, try these steps:  · Walk. Take a short walk (10 to 20 minutes) on a level surface (no slopes, hills, or stairs) every 2 to 3 hours. Walk only distances you can manage without pain, especially leg pain. · Relax. Find a comfortable position for rest. Some people are comfortable on the floor or a medium-firm bed with a small pillow under their head and another under their knees. Some people prefer to lie on their side with a pillow between their knees. Don't stay in one position for too long. · Try heat or ice. Try using a heating pad on a low or medium setting, or take a warm shower, for 15 to 20 minutes every 2 to 3 hours. Or you can buy single-use heat wraps that last up to 8 hours. You can also try an ice pack for 10 to 15 minutes every 2 to 3 hours. You can use an ice pack or a bag of frozen vegetables wrapped in a thin towel. There is not strong evidence that either heat or ice will help, but you can try them to see if they help. You may also want to try switching between heat and cold. · Take pain medicine exactly as directed. ? If the doctor gave you a prescription medicine for pain, take it as prescribed.   ? If you are not taking a prescription pain medicine, ask your doctor if you can take an over-the-counter medicine. What else can you do? · Stretch and exercise. Exercises that increase flexibility may relieve your pain and make it easier for your muscles to keep your spine in a good, neutral position. And don't forget to keep walking. · Do self-massage. You can use self-massage to unwind after work or school or to energize yourself in the morning. You can easily massage your feet, hands, or neck. Self-massage works best if you are in comfortable clothes and are sitting or lying in a comfortable position. Use oil or lotion to massage bare skin. · Reduce stress. Back pain can lead to a vicious [place]: Distress about the pain tenses the muscles in your back, which in turn causes more pain. Learn how to relax your mind and your muscles to lower your stress. Where can you learn more? Go to http://www.gray.com/  Enter Q517 in the search box to learn more about \"Learning About Relief for Back Pain. \"  Current as of: November 16, 2020               Content Version: 12.8  © 2006-2021 [employer] Inc[employer]. Care instructions adapted under license by [employer] (which disclaims liability or warranty for this information). If you have questions about a medical condition or this instruction, always ask your healthcare professional. [address] any warranty or liability for your use of this information.

## 2021-10-05 ENCOUNTER — HOSPITAL ENCOUNTER (OUTPATIENT)
Dept: GENERAL RADIOLOGY | Age: 61
Discharge: HOME | End: 2021-10-05

## 2021-10-05 DIAGNOSIS — M48.062 SPINAL STENOSIS OF LUMBAR REGION WITH NEUROGENIC CLAUDICATION: ICD-10-CM

## 2021-10-05 DIAGNOSIS — M54.16 LUMBAR RADICULOPATHY: ICD-10-CM

## 2021-10-05 PROCEDURE — 72100 X-RAY EXAM L-S SPINE 2/3 VWS: CPT

## 2021-12-05 ENCOUNTER — HOSPITAL ENCOUNTER (INPATIENT)
Age: 61
LOS: 4 days | Discharge: HOME | DRG: 683 | End: 2021-12-09
Attending: EMERGENCY MEDICINE | Admitting: HOSPITALIST

## 2021-12-05 DIAGNOSIS — N17.9 ACUTE KIDNEY INJURY (HCC): ICD-10-CM

## 2021-12-05 DIAGNOSIS — E86.0 DEHYDRATION: Primary | ICD-10-CM

## 2021-12-05 PROBLEM — A08.4 VIRAL GASTROENTERITIS: Status: ACTIVE | Noted: 2021-12-05

## 2021-12-05 PROBLEM — R11.2 NAUSEA & VOMITING: Status: ACTIVE | Noted: 2021-12-05

## 2021-12-05 LAB
ALBUMIN SERPL-MCNC: 3.7 G/DL (ref 3.2–4.6)
ALBUMIN/GLOB SERPL: 1 {RATIO} (ref 1.2–3.5)
ALP SERPL-CCNC: 61 U/L (ref 50–136)
ALT SERPL-CCNC: 67 U/L (ref 12–65)
ANION GAP SERPL CALC-SCNC: 7 MMOL/L (ref 7–16)
AST SERPL-CCNC: 75 U/L (ref 15–37)
BASOPHILS # BLD: 0 K/UL (ref 0–0.2)
BASOPHILS NFR BLD: 0 % (ref 0–2)
BILIRUB SERPL-MCNC: 0.9 MG/DL (ref 0.2–1.1)
BUN SERPL-MCNC: 27 MG/DL (ref 8–23)
CALCIUM SERPL-MCNC: 9 MG/DL (ref 8.3–10.4)
CHLORIDE SERPL-SCNC: 106 MMOL/L (ref 98–107)
CO2 SERPL-SCNC: 24 MMOL/L (ref 21–32)
CREAT SERPL-MCNC: 2.89 MG/DL (ref 0.6–1)
DIFFERENTIAL METHOD BLD: ABNORMAL
EOSINOPHIL # BLD: 0.1 K/UL (ref 0–0.8)
EOSINOPHIL NFR BLD: 1 % (ref 0.5–7.8)
ERYTHROCYTE [DISTWIDTH] IN BLOOD BY AUTOMATED COUNT: 12.1 % (ref 11.9–14.6)
GLOBULIN SER CALC-MCNC: 3.6 G/DL (ref 2.3–3.5)
GLUCOSE SERPL-MCNC: 113 MG/DL (ref 65–100)
HCT VFR BLD AUTO: 39.1 % (ref 35.8–46.3)
HGB BLD-MCNC: 13 G/DL (ref 11.7–15.4)
IMM GRANULOCYTES # BLD AUTO: 0 K/UL (ref 0–0.5)
IMM GRANULOCYTES NFR BLD AUTO: 0 % (ref 0–5)
LIPASE SERPL-CCNC: 144 U/L (ref 73–393)
LYMPHOCYTES # BLD: 1.3 K/UL (ref 0.5–4.6)
LYMPHOCYTES NFR BLD: 18 % (ref 13–44)
MAGNESIUM SERPL-MCNC: 1.8 MG/DL (ref 1.8–2.4)
MCH RBC QN AUTO: 29.6 PG (ref 26.1–32.9)
MCHC RBC AUTO-ENTMCNC: 33.2 G/DL (ref 31.4–35)
MCV RBC AUTO: 89.1 FL (ref 79.6–97.8)
MONOCYTES # BLD: 0.7 K/UL (ref 0.1–1.3)
MONOCYTES NFR BLD: 9 % (ref 4–12)
NEUTS SEG # BLD: 5.3 K/UL (ref 1.7–8.2)
NEUTS SEG NFR BLD: 72 % (ref 43–78)
NRBC # BLD: 0 K/UL (ref 0–0.2)
PLATELET # BLD AUTO: 333 K/UL (ref 150–450)
PMV BLD AUTO: 8.7 FL (ref 9.4–12.3)
POTASSIUM SERPL-SCNC: 3.7 MMOL/L (ref 3.5–5.1)
PROT SERPL-MCNC: 7.3 G/DL (ref 6.3–8.2)
RBC # BLD AUTO: 4.39 M/UL (ref 4.05–5.2)
SODIUM SERPL-SCNC: 137 MMOL/L (ref 136–145)
WBC # BLD AUTO: 7.3 K/UL (ref 4.3–11.1)

## 2021-12-05 PROCEDURE — 94762 N-INVAS EAR/PLS OXIMTRY CONT: CPT

## 2021-12-05 PROCEDURE — 74011250636 HC RX REV CODE- 250/636: Performed by: HOSPITALIST

## 2021-12-05 PROCEDURE — 74011250637 HC RX REV CODE- 250/637: Performed by: FAMILY MEDICINE

## 2021-12-05 PROCEDURE — 74011250637 HC RX REV CODE- 250/637: Performed by: HOSPITALIST

## 2021-12-05 PROCEDURE — 99283 EMERGENCY DEPT VISIT LOW MDM: CPT

## 2021-12-05 PROCEDURE — 2709999900 HC NON-CHARGEABLE SUPPLY

## 2021-12-05 PROCEDURE — 65270000029 HC RM PRIVATE

## 2021-12-05 PROCEDURE — 83690 ASSAY OF LIPASE: CPT

## 2021-12-05 PROCEDURE — 83735 ASSAY OF MAGNESIUM: CPT

## 2021-12-05 PROCEDURE — 85025 COMPLETE CBC W/AUTO DIFF WBC: CPT

## 2021-12-05 PROCEDURE — 80053 COMPREHEN METABOLIC PANEL: CPT

## 2021-12-05 PROCEDURE — 74011250636 HC RX REV CODE- 250/636: Performed by: EMERGENCY MEDICINE

## 2021-12-05 PROCEDURE — 96374 THER/PROPH/DIAG INJ IV PUSH: CPT

## 2021-12-05 RX ORDER — MAG HYDROX/ALUMINUM HYD/SIMETH 200-200-20
15 SUSPENSION, ORAL (FINAL DOSE FORM) ORAL
Status: DISCONTINUED | OUTPATIENT
Start: 2021-12-05 | End: 2021-12-09 | Stop reason: HOSPADM

## 2021-12-05 RX ORDER — ONDANSETRON 4 MG/1
4 TABLET, ORALLY DISINTEGRATING ORAL
Status: DISCONTINUED | OUTPATIENT
Start: 2021-12-05 | End: 2021-12-09 | Stop reason: HOSPADM

## 2021-12-05 RX ORDER — ONDANSETRON 2 MG/ML
4 INJECTION INTRAMUSCULAR; INTRAVENOUS
Status: DISCONTINUED | OUTPATIENT
Start: 2021-12-05 | End: 2021-12-09 | Stop reason: HOSPADM

## 2021-12-05 RX ORDER — ATORVASTATIN CALCIUM 40 MG/1
40 TABLET, FILM COATED ORAL
Status: DISCONTINUED | OUTPATIENT
Start: 2021-12-05 | End: 2021-12-09 | Stop reason: HOSPADM

## 2021-12-05 RX ORDER — SODIUM CHLORIDE, SODIUM LACTATE, POTASSIUM CHLORIDE, CALCIUM CHLORIDE 600; 310; 30; 20 MG/100ML; MG/100ML; MG/100ML; MG/100ML
150 INJECTION, SOLUTION INTRAVENOUS CONTINUOUS
Status: DISPENSED | OUTPATIENT
Start: 2021-12-05 | End: 2021-12-07

## 2021-12-05 RX ORDER — POTASSIUM CHLORIDE 7.45 MG/ML
10 INJECTION INTRAVENOUS AS NEEDED
Status: DISCONTINUED | OUTPATIENT
Start: 2021-12-05 | End: 2021-12-09 | Stop reason: HOSPADM

## 2021-12-05 RX ORDER — FAMOTIDINE 20 MG/1
20 TABLET, FILM COATED ORAL 2 TIMES DAILY
Status: DISCONTINUED | OUTPATIENT
Start: 2021-12-05 | End: 2021-12-09 | Stop reason: HOSPADM

## 2021-12-05 RX ORDER — ATENOLOL 50 MG/1
50 TABLET ORAL DAILY
Status: DISCONTINUED | OUTPATIENT
Start: 2021-12-06 | End: 2021-12-09 | Stop reason: HOSPADM

## 2021-12-05 RX ORDER — ONDANSETRON 2 MG/ML
4 INJECTION INTRAMUSCULAR; INTRAVENOUS
Status: COMPLETED | OUTPATIENT
Start: 2021-12-05 | End: 2021-12-05

## 2021-12-05 RX ORDER — SODIUM CHLORIDE 0.9 % (FLUSH) 0.9 %
5-40 SYRINGE (ML) INJECTION AS NEEDED
Status: DISCONTINUED | OUTPATIENT
Start: 2021-12-05 | End: 2021-12-09 | Stop reason: HOSPADM

## 2021-12-05 RX ORDER — SODIUM CHLORIDE 0.9 % (FLUSH) 0.9 %
5-10 SYRINGE (ML) INJECTION EVERY 8 HOURS
Status: DISCONTINUED | OUTPATIENT
Start: 2021-12-05 | End: 2021-12-09 | Stop reason: HOSPADM

## 2021-12-05 RX ORDER — CYCLOBENZAPRINE HCL 10 MG
10 TABLET ORAL
Status: DISCONTINUED | OUTPATIENT
Start: 2021-12-05 | End: 2021-12-09 | Stop reason: HOSPADM

## 2021-12-05 RX ORDER — LORAZEPAM 1 MG/1
1 TABLET ORAL
Status: DISCONTINUED | OUTPATIENT
Start: 2021-12-05 | End: 2021-12-09 | Stop reason: HOSPADM

## 2021-12-05 RX ORDER — ZOLPIDEM TARTRATE 5 MG/1
5 TABLET ORAL
Status: DISCONTINUED | OUTPATIENT
Start: 2021-12-05 | End: 2021-12-09 | Stop reason: HOSPADM

## 2021-12-05 RX ORDER — ACETAMINOPHEN 325 MG/1
650 TABLET ORAL
Status: DISCONTINUED | OUTPATIENT
Start: 2021-12-05 | End: 2021-12-09 | Stop reason: HOSPADM

## 2021-12-05 RX ORDER — ENOXAPARIN SODIUM 100 MG/ML
30 INJECTION SUBCUTANEOUS DAILY
Status: DISCONTINUED | OUTPATIENT
Start: 2021-12-06 | End: 2021-12-09 | Stop reason: HOSPADM

## 2021-12-05 RX ORDER — ACETAMINOPHEN 650 MG/1
650 SUPPOSITORY RECTAL
Status: DISCONTINUED | OUTPATIENT
Start: 2021-12-05 | End: 2021-12-09 | Stop reason: HOSPADM

## 2021-12-05 RX ORDER — POLYETHYLENE GLYCOL 3350 17 G/17G
17 POWDER, FOR SOLUTION ORAL DAILY PRN
Status: DISCONTINUED | OUTPATIENT
Start: 2021-12-05 | End: 2021-12-09 | Stop reason: HOSPADM

## 2021-12-05 RX ORDER — SODIUM CHLORIDE 0.9 % (FLUSH) 0.9 %
5-40 SYRINGE (ML) INJECTION EVERY 8 HOURS
Status: DISCONTINUED | OUTPATIENT
Start: 2021-12-05 | End: 2021-12-09 | Stop reason: HOSPADM

## 2021-12-05 RX ORDER — MAGNESIUM SULFATE HEPTAHYDRATE 40 MG/ML
2 INJECTION, SOLUTION INTRAVENOUS AS NEEDED
Status: DISCONTINUED | OUTPATIENT
Start: 2021-12-05 | End: 2021-12-09 | Stop reason: HOSPADM

## 2021-12-05 RX ORDER — SAME BUTANEDISULFONATE/BETAINE 400-600 MG
500 POWDER IN PACKET (EA) ORAL 2 TIMES DAILY
Status: DISCONTINUED | OUTPATIENT
Start: 2021-12-05 | End: 2021-12-09 | Stop reason: HOSPADM

## 2021-12-05 RX ORDER — SODIUM CHLORIDE 0.9 % (FLUSH) 0.9 %
5-10 SYRINGE (ML) INJECTION AS NEEDED
Status: DISCONTINUED | OUTPATIENT
Start: 2021-12-05 | End: 2021-12-09 | Stop reason: HOSPADM

## 2021-12-05 RX ADMIN — Medication 10 ML: at 21:45

## 2021-12-05 RX ADMIN — Medication 500 MG: at 22:53

## 2021-12-05 RX ADMIN — SODIUM CHLORIDE, SODIUM LACTATE, POTASSIUM CHLORIDE, AND CALCIUM CHLORIDE 100 ML/HR: 600; 310; 30; 20 INJECTION, SOLUTION INTRAVENOUS at 21:44

## 2021-12-05 RX ADMIN — SODIUM CHLORIDE, SODIUM LACTATE, POTASSIUM CHLORIDE, AND CALCIUM CHLORIDE 1000 ML: 600; 310; 30; 20 INJECTION, SOLUTION INTRAVENOUS at 15:21

## 2021-12-05 RX ADMIN — ATORVASTATIN CALCIUM 40 MG: 40 TABLET, FILM COATED ORAL at 22:53

## 2021-12-05 RX ADMIN — ONDANSETRON 4 MG: 2 INJECTION INTRAMUSCULAR; INTRAVENOUS at 15:21

## 2021-12-05 RX ADMIN — LORAZEPAM 1 MG: 1 TABLET ORAL at 22:52

## 2021-12-05 NOTE — ED PROVIDER NOTES
Per nurses notes: \"Patient arrives from [address] [place] [address] with c/o n/v and SOB. States home medications not helping with n/v. No hx of asthma/COPD. SOB ever since spine surgery. \"    Patient describes shortness of breath is worse when she gets upset or when the weather changes, not necessarily associated with activity or position. Has longstanding history of anxiety. The history is provided by the patient and the spouse. Vomiting   This is a new problem. The current episode started more than 2 days ago. The problem occurs 2 to 4 times per day. The problem has been gradually worsening. The emesis has an appearance of stomach contents and clear. There has been no fever. Associated symptoms include diarrhea. Pertinent negatives include no chills, no fever, no sweats, no abdominal pain, no headaches, no arthralgias, no myalgias, no cough, no URI and no headaches. The patient is not pregnant. Her pertinent negatives include no irritable bowel syndrome, no inflammatory bowel disease, no short gut syndrome, no bowel resection, no recent abdominal surgery, no malabsorption, no gastric bypass and no DM.         Past Medical History:   Diagnosis Date    Allergic rhinitis     Anxiety     COVID-19 02/2021    not hospitalized, reports flu symptoms, fever, cough     GERD (gastroesophageal reflux disease)     managed with medication    Hypercholesterolemia     Hypertension     Hypocholesterolemia     Insomnia     Lumbar stenosis with neurogenic claudication     Menopause     Migraine with aura     Morbid obesity ([address]        Past Surgical History:   Procedure Laterality Date    HX COLONOSCOPY  2015    HX DILATION AND EVACUATION  1979    HX LUMBAR FUSION  09/08/2021    left L4-5  TLIF    HX DALE AND BSO  1992    for fibroids    HX TUBAL LIGATION Bilateral 1984    HX WISDOM TEETH EXTRACTION  1983         Family History:   Problem Relation Age of Onset    Breast Cancer Cousin 39    Diabetes Mother         type 2    Heart Disease Mother         MVP    Diabetes Father         type 2    Hypertension Father     Kidney Disease Father         renal failure kidney transplant    Heart Surgery Father         CABG x 4    Other Sister         eating disorder    Hypertension Brother     Diabetes Maternal Grandmother     No Known Problems Maternal Grandfather     Colon Cancer Paternal Grandmother 47    Heart Failure Paternal Grandfather     Ovarian Cancer Neg Hx     Uterine Cancer Neg Hx        Social History     Socioeconomic History    Marital status:      Spouse name: Not on file    Number of children: Not on file    Years of education: Not on file    Highest education level: Not on file   Occupational History    Not on file   Tobacco Use    Smoking status: Former Smoker     Packs/day: 0.10     Years: 8.00     Pack years: 0.80     Quit date: 4/18/2018     Years since quitting: 3.6    Smokeless tobacco: Never Used    Tobacco comment: ~2 months not smoking   Vaping Use    Vaping Use: Never used   Substance and Sexual Activity    Alcohol use: Yes     Alcohol/week: 2.0 standard drinks     Types: 2 Glasses of wine per week    Drug use: No    Sexual activity: Yes     Partners: Male     Birth control/protection: Surgical   Other Topics Concern    Not on file   Social History Narrative    , 1 grown daughter, getting remarried 9/3017     Social Determinants of Health     Financial Resource Strain:     Difficulty of Paying Living Expenses: Not on file   Food Insecurity:     Worried About [address] in the Last Year: Not on file    Leila of Food in the Last Year: Not on file   Transportation Needs:     Lack of Transportation (Medical): Not on file    Lack of Transportation (Non-Medical):  Not on file   Physical Activity:     Days of Exercise per Week: Not on file    Minutes of Exercise per Session: Not on file   Stress:     Feeling of Stress : Not on file   Social Connections:     Frequency of Communication with Friends and Family: Not on file    Frequency of Social Gatherings with Friends and Family: Not on file    Attends Church Services: Not on file    Active Member of Clubs or Organizations: Not on file    Attends Club or Organization Meetings: Not on file    Marital Status: Not on file   Intimate Partner Violence:     Fear of Current or Ex-Partner: Not on file    Emotionally Abused: Not on file    Physically Abused: Not on file    Sexually Abused: Not on file   Housing Stability:     Unable to Pay for Housing in the Last Year: Not on file    Number of [place] in the Last Year: Not on file    Unstable Housing in the Last Year: Not on file         ALLERGIES: Codeine, Lortab [hydrocodone-acetaminophen], Morphine, and Nabumetone    Review of Systems   Constitutional: Negative for chills and fever. Respiratory: Negative for cough. Gastrointestinal: Positive for diarrhea, nausea and vomiting. Negative for abdominal pain, blood in stool and constipation. Musculoskeletal: Negative for arthralgias and myalgias. Neurological: Negative for headaches. All other systems reviewed and are negative. Vitals:    12/05/21 1303   BP: 97/65   Pulse: (!) 112   Resp: 18   Temp: 98.7 °F (37.1 °C)   SpO2: 99%   Weight: 90.7 kg (200 lb)   Height: 5' 7\" (1.702 m)            Physical Exam  Vitals and nursing note reviewed. Constitutional:       General: She is in acute distress. Appearance: She is well-developed. HENT:      Head: Normocephalic and atraumatic. Right Ear: External ear normal.      Left Ear: External ear normal.   Eyes:      Extraocular Movements: Extraocular movements intact. Conjunctiva/sclera: Conjunctivae normal.      Pupils: Pupils are equal, round, and reactive to light. Cardiovascular:      Rate and Rhythm: Normal rate and regular rhythm. Heart sounds: Normal heart sounds. No murmur heard.       Pulmonary:      Effort: Pulmonary effort is normal. Breath sounds: Normal breath sounds. No wheezing, rhonchi or rales. Abdominal:      General: Bowel sounds are normal. There is no distension. Palpations: Abdomen is soft. There is no mass. Tenderness: There is no abdominal tenderness. There is no right CVA tenderness, left CVA tenderness, guarding or rebound. Hernia: No hernia is present. Musculoskeletal:         General: Normal range of motion. Cervical back: Normal range of motion and neck supple. Right lower leg: No edema. Left lower leg: No edema. Skin:     General: Skin is warm and dry. Capillary Refill: Capillary refill takes less than 2 seconds. Neurological:      General: No focal deficit present. Mental Status: She is alert and oriented to person, place, and time. Psychiatric:         Mood and Affect: Mood normal.         Behavior: Behavior normal.          MDM  Number of Diagnoses or Management Options  Acute kidney injury ([facility] [address]): new and requires workup  Dehydration: new and requires workup     Amount and/or Complexity of Data Reviewed  Clinical lab tests: ordered and reviewed  Review and summarize past medical records: yes    Risk of Complications, Morbidity, and/or Mortality  Presenting problems: moderate  Diagnostic procedures: minimal  Management options: moderate    Patient Progress  Patient progress: stable         Procedures  The patient was observed in the ED. patient was given Zofran for nausea, normal saline bolus, the case was discussed with hospitalist for admission.     Results Reviewed:      Recent Results (from the past 24 hour(s))   CBC WITH AUTOMATED DIFF    Collection Time: 12/05/21  1:05 PM   Result Value Ref Range    WBC 7.3 4.3 - 11.1 K/uL    RBC 4.39 4.05 - 5.2 M/uL    HGB 13.0 11.7 - 15.4 g/dL    HCT 39.1 35.8 - 46.3 %    MCV 89.1 79.6 - 97.8 FL    MCH 29.6 26.1 - 32.9 PG    MCHC 33.2 31.4 - 35.0 g/dL    RDW 12.1 11.9 - 14.6 %    PLATELET 609 657 - 775 K/uL    MPV 8.7 (L) 9.4 - 12.3 FL    ABSOLUTE NRBC 0.00 0.0 - 0.2 K/uL    DF AUTOMATED      NEUTROPHILS 72 43 - 78 %    LYMPHOCYTES 18 13 - 44 %    MONOCYTES 9 4.0 - 12.0 %    EOSINOPHILS 1 0.5 - 7.8 %    BASOPHILS 0 0.0 - 2.0 %    IMMATURE GRANULOCYTES 0 0.0 - 5.0 %    ABS. NEUTROPHILS 5.3 1.7 - 8.2 K/UL    ABS. LYMPHOCYTES 1.3 0.5 - 4.6 K/UL    ABS. MONOCYTES 0.7 0.1 - 1.3 K/UL    ABS. EOSINOPHILS 0.1 0.0 - 0.8 K/UL    ABS. BASOPHILS 0.0 0.0 - 0.2 K/UL    ABS. IMM. GRANS. 0.0 0.0 - 0.5 K/UL   METABOLIC PANEL, COMPREHENSIVE    Collection Time: 12/05/21  1:05 PM   Result Value Ref Range    Sodium 137 136 - 145 mmol/L    Potassium 3.7 3.5 - 5.1 mmol/L    Chloride 106 98 - 107 mmol/L    CO2 24 21 - 32 mmol/L    Anion gap 7 7 - 16 mmol/L    Glucose 113 (H) 65 - 100 mg/dL    BUN 27 (H) 8 - 23 MG/DL    Creatinine 2.89 (H) 0.6 - 1.0 MG/DL    GFR est AA 21 (L) >60 ml/min/1.73m2    GFR est non-AA 18 (L) >60 ml/min/1.73m2    Calcium 9.0 8.3 - 10.4 MG/DL    Bilirubin, total 0.9 0.2 - 1.1 MG/DL    ALT (SGPT) 67 (H) 12 - 65 U/L    AST (SGOT) 75 (H) 15 - 37 U/L    Alk.  phosphatase 61 50 - 136 U/L    Protein, total 7.3 6.3 - 8.2 g/dL    Albumin 3.7 3.2 - 4.6 g/dL    Globulin 3.6 (H) 2.3 - 3.5 g/dL    A-G Ratio 1.0 (L) 1.2 - 3.5     MAGNESIUM    Collection Time: 12/05/21  1:05 PM   Result Value Ref Range    Magnesium 1.8 1.8 - 2.4 mg/dL   LIPASE    Collection Time: 12/05/21  1:05 PM   Result Value Ref Range    Lipase 144 73 - 393 U/L

## 2021-12-05 NOTE — ED TRIAGE NOTES
Patient arrives from [address] [place] [address] with c/o n/v and SOB. States home medications not helping with n/v. No hx of asthma/COPD. SOB ever since spine surgery.

## 2021-12-05 NOTE — H&P
[facility]ist History and Physical       Name:  Joe August  Age:61 y.o. Sex:female   :  1960    MRN:  [id number]   PCP:  Sol Sapp MD      Admit Date:  2021 12:55 PM   Chief Complaint: Nausea vomiting and diarrhea for past 1 week. Reason for Admission:   Acute renal failure (ARF) ([address] [N17.9]  Dehydration [E86.0]  Nausea & vomiting [R11.2]    Assessment & Plan:     #Viral gastroenteritis//nausea vomiting and diarrhea  Resulting in dehydration. Symptomatic treatment with IV fluids  cc/h  Zofran as needed for nausea vomiting  Adding Florastor 500 mg p.o. twice daily    #Acute renal failure:  Creatinine 2.8 on admission, baseline less than 1.0  IV fluids, LR at 100 cc/h  Avoid nephrotoxic medications, monitor daily BMP  Will hold benazepril, triamterene and HCTZ. Patient does not take gabapentin for past 2 days. Will resume once renal function is back to normal.    #HTN:  BP is borderline low  We will hold all antihypertensive medications for now. Continue IV fluids  Monitor hemodynamic parameters, consider restarting antihypertensives once kidney function and BP is stable  Atenolol 50 mg po daily resumed    #Dyslipidemia:  Restarting Lipitor    #Obesity: BMI 31.3   Patient encouraged to lose weight, counseled for dietary and lifestyle modification:  Calorie controlled diet while inpatient. PT and OT eval      Disposition/Expected LOS: More than 2 midnights pending clinical course  Diet: DIET ADULT  VTE ppx: Lovenox  GI ppx: Pepcid  Code status: Full Code        History of Presenting Illness:     Joe August is a 57 y.o. female with medical history of hypertension, obesity, chronic back pain status post recent surgery, dyslipidemia who presented to the ER with 1 week history of nausea vomiting and intermittent diarrhea. Patient denies having any abdominal pain, urinary symptoms, chest pain, cough congestion or shortness of breath.   Patient does report poor oral intake and has not been drinking enough liquids. She denies having lightheadedness, dizziness, headache, focal neurological deficits. ER work-up was remarkable for creatinine of 2.8, heart rate ranging from 100 - 110. Review of Systems:  A 14 point review of systems was taken and pertinent positive as per HPI.         Past Medical History:   Diagnosis Date    Allergic rhinitis     Anxiety     COVID-19 02/2021    not hospitalized, reports flu symptoms, fever, cough     GERD (gastroesophageal reflux disease)     managed with medication    Hypercholesterolemia     Hypertension     Hypocholesterolemia     Insomnia     Lumbar stenosis with neurogenic claudication     Menopause     Migraine with aura     Morbid obesity ([facility] [address]        Past Surgical History:   Procedure Laterality Date    HX COLONOSCOPY  2015    HX DILATION AND EVACUATION  1979    HX LUMBAR FUSION  09/08/2021    left L4-5  TLIF    HX DALE AND BSO  1992    for fibroids    HX TUBAL LIGATION Bilateral 1984    HX WISDOM TEETH EXTRACTION  1983       Family History : reviewed  Family History   Problem Relation Age of Onset    Breast Cancer Cousin 39    Diabetes Mother         type 2    Heart Disease Mother         MVP    Diabetes Father         type 2    Hypertension Father     Kidney Disease Father         renal failure kidney transplant    Heart Surgery Father         CABG x 4    Other Sister         eating disorder    Hypertension Brother     Diabetes Maternal Grandmother     No Known Problems Maternal Grandfather     Colon Cancer Paternal Grandmother 47    Heart Failure Paternal Grandfather     Ovarian Cancer Neg Hx     Uterine Cancer Neg Hx         Social History     Tobacco Use    Smoking status: Former Smoker     Packs/day: 0.10     Years: 8.00     Pack years: 0.80     Quit date: 4/18/2018     Years since quitting: 3.6    Smokeless tobacco: Never Used    Tobacco comment: ~2 months not smoking Substance Use Topics    Alcohol use: Yes     Alcohol/week: 2.0 standard drinks     Types: 2 Glasses of wine per week       Allergies   Allergen Reactions    Codeine Other (comments)     Slows Down Breathing    Lortab [Hydrocodone-Acetaminophen] Anxiety    Morphine Other (comments)     Slows Down Breathing    Nabumetone Nausea and Vomiting       Immunization History   Administered Date(s) Administered    Pneumococcal Conjugate (PCV-13) 08/25/2015    Tdap 08/25/2015         PTA Medications:  Current Outpatient Medications   Medication Instructions    atenoloL (TENORMIN) 50 mg tablet Oral, DAILY    atorvastatin (LIPITOR) 40 mg tablet Oral, EVERY BEDTIME    benazepriL (LOTENSIN) 40 mg, Oral, DAILY    butalbital-acetaminophen-caffeine (FIORICET, ESGIC) -40 mg per tablet 1 Tablet, Oral, EVERY 6 HOURS AS NEEDED    cyclobenzaprine (FLEXERIL) 10 mg, Oral, 3 TIMES DAILY AS NEEDED    estradioL (ESTRACE) 1 mg, Oral, DAILY    gabapentin (NEURONTIN) 300 mg, Oral, 3 TIMES DAILY    LORazepam (ATIVAN) 1 mg, Oral, DAILY AS NEEDED    omeprazole (PRILOSEC) 40 mg, Oral, DAILY    promethazine (PHENERGAN) 25 mg, Oral, EVERY 6 HOURS AS NEEDED    triamterene-hydroCHLOROthiazide (DYAZIDE) 37.5-25 mg per capsule Oral, DAILY    zolpidem (AMBIEN) 5 mg, Oral, BEDTIME PRN       Objective:     Patient Vitals for the past 24 hrs:   Temp Pulse Resp BP SpO2   12/05/21 1303 98.7 °F (37.1 °C) (!) 112 18 97/65 99 %       Oxygen Therapy  O2 Sat (%): 99 % (Simultaneous filing. User may not have seen previous data.) (12/05/21 1303)  O2 Device: None (Room air) (12/05/21 1303)    Body mass index is 31.32 kg/m².     Physical Exam:    General:  Alert, awake, NAD, obese, on room air  HEENT:  Head NCAT, PERRLA positive  Neck:   Supple, no lymphadenopathy, no JVD   Lungs:  Clear to auscultation bilaterally   CV:   Regular rate and rhythm with normal S1 and S2   Abdomen:  Soft, nontender, nondistended, normoactive bowel sounds Extremities:  No cyanosis clubbing or edema   Neuro:  GCS 15, cranial nerves intact  Psych:  Normal mood and affect       Data Reviewed: I have reviewed all labs, meds, and studies. Recent Results (from the past 24 hour(s))   CBC WITH AUTOMATED DIFF    Collection Time: 12/05/21  1:05 PM   Result Value Ref Range    WBC 7.3 4.3 - 11.1 K/uL    RBC 4.39 4.05 - 5.2 M/uL    HGB 13.0 11.7 - 15.4 g/dL    HCT 39.1 35.8 - 46.3 %    MCV 89.1 79.6 - 97.8 FL    MCH 29.6 26.1 - 32.9 PG    MCHC 33.2 31.4 - 35.0 g/dL    RDW 12.1 11.9 - 14.6 %    PLATELET 815 928 - 445 K/uL    MPV 8.7 (L) 9.4 - 12.3 FL    ABSOLUTE NRBC 0.00 0.0 - 0.2 K/uL    DF AUTOMATED      NEUTROPHILS 72 43 - 78 %    LYMPHOCYTES 18 13 - 44 %    MONOCYTES 9 4.0 - 12.0 %    EOSINOPHILS 1 0.5 - 7.8 %    BASOPHILS 0 0.0 - 2.0 %    IMMATURE GRANULOCYTES 0 0.0 - 5.0 %    ABS. NEUTROPHILS 5.3 1.7 - 8.2 K/UL    ABS. LYMPHOCYTES 1.3 0.5 - 4.6 K/UL    ABS. MONOCYTES 0.7 0.1 - 1.3 K/UL    ABS. EOSINOPHILS 0.1 0.0 - 0.8 K/UL    ABS. BASOPHILS 0.0 0.0 - 0.2 K/UL    ABS. IMM. GRANS. 0.0 0.0 - 0.5 K/UL   METABOLIC PANEL, COMPREHENSIVE    Collection Time: 12/05/21  1:05 PM   Result Value Ref Range    Sodium 137 136 - 145 mmol/L    Potassium 3.7 3.5 - 5.1 mmol/L    Chloride 106 98 - 107 mmol/L    CO2 24 21 - 32 mmol/L    Anion gap 7 7 - 16 mmol/L    Glucose 113 (H) 65 - 100 mg/dL    BUN 27 (H) 8 - 23 MG/DL    Creatinine 2.89 (H) 0.6 - 1.0 MG/DL    GFR est AA 21 (L) >60 ml/min/1.73m2    GFR est non-AA 18 (L) >60 ml/min/1.73m2    Calcium 9.0 8.3 - 10.4 MG/DL    Bilirubin, total 0.9 0.2 - 1.1 MG/DL    ALT (SGPT) 67 (H) 12 - 65 U/L    AST (SGOT) 75 (H) 15 - 37 U/L    Alk.  phosphatase 61 50 - 136 U/L    Protein, total 7.3 6.3 - 8.2 g/dL    Albumin 3.7 3.2 - 4.6 g/dL    Globulin 3.6 (H) 2.3 - 3.5 g/dL    A-G Ratio 1.0 (L) 1.2 - 3.5     MAGNESIUM    Collection Time: 12/05/21  1:05 PM   Result Value Ref Range    Magnesium 1.8 1.8 - 2.4 mg/dL   LIPASE    Collection Time: 12/05/21 1:05 PM   Result Value Ref Range    Lipase 144 73 - 393 U/L       EKG Results     None          All Micro Results     None          Other Studies:  No results found.       Medications:  Medications Administered       acetaminophen (TYLENOL) tablet 650 mg       Admin Date  03/13/2021 Action  Given Dose  650 mg Route  Oral Administered By  Radha Priest RN              cefTRIAXone (ROCEPHIN) 1 g in 0.9% sodium chloride (MBP/ADV) 50 mL MBP       Admin Date  03/13/2021 Action  New Bag Dose  1 g Rate  100 mL/hr Route  IntraVENous Administered By  Gabby Randle RN              cefTRIAXone (ROCEPHIN) 2 g in 0.9% sodium chloride (MBP/ADV) 50 mL MBP       Admin Date  03/13/2021 Action  New Bag Dose  2 g Rate  100 mL/hr Route  IntraVENous Administered By  Gerard Pyle RN               Admin Date  03/14/2021 Action  New Bag Dose  2 g Rate  100 mL/hr Route  IntraVENous Administered By  Pamela Estevez RN               Admin Date  03/15/2021 Action  New Bag Dose  2 g Rate  100 mL/hr Route  IntraVENous Administered By  Pamela Estevez RN               Admin Date  03/16/2021 Action  New Bag Dose  2 g Rate  100 mL/hr Route  IntraVENous Administered By  Carmella Serna              diphenhydrAMINE (BENADRYL) injection 25 mg       Admin Date  03/14/2021 Action  Given Dose  25 mg Route  IntraVENous Administered By  Bogdan Valverde RN              doxycycline (VIBRAMYCIN) 100 mg in 0.9% sodium chloride (MBP/ADV) 100 mL MBP       Admin Date  03/13/2021 Action  New Bag Dose  100 mg Rate  100 mL/hr Route  IntraVENous Administered By  Gerard Pyle RN               Admin Date  03/13/2021 Action  New Bag Dose  100 mg Rate  100 mL/hr Route  IntraVENous Administered By  Bogdan Valverde RN               Admin Date  03/14/2021 Action  New Bag Dose  100 mg Rate  100 mL/hr Route  IntraVENous Administered By  Pamela Estevez RN               Admin Date  03/14/2021 Action  New Bag Dose  100 mg Rate  100 mL/hr Route  IntraVENous Administered By  Santos Qiu RN               Admin Date  03/15/2021 Action  New Bag Dose  100 mg Rate  100 mL/hr Route  IntraVENous Administered By  Racquel Stephens RN              enoxaparin (LOVENOX) injection 40 mg       Admin Date  03/13/2021 Action  Given Dose  40 mg Route  SubCUTAneous Administered By  Rina Julian RN               Admin Date  03/13/2021 Action  Given Dose  40 mg Route  SubCUTAneous Administered By  Santos Qiu RN               Admin Date  03/14/2021 Action  Given Dose  40 mg Route  SubCUTAneous Administered By  Racquel Stephens RN               Admin Date  03/14/2021 Action  Given Dose  40 mg Route  SubCUTAneous Administered By  Santos Qiu RN               Admin Date  03/15/2021 Action  Given Dose  40 mg Route  SubCUTAneous Administered By  Racquel Stephens RN               Admin Date  03/15/2021 Action  Given Dose  40 mg Route  SubCUTAneous Administered By  Karien Ramírez RN               Admin Date  03/16/2021 Action  Given Dose  40 mg Route  SubCUTAneous Administered By  Severo Devine              fentaNYL citrate (PF) injection 50 mcg       Admin Date  03/13/2021 Action  Given Dose  50 mcg Route  IntraVENous Administered By  Rina Julian RN               Admin Date  03/14/2021 Action  Given Dose  50 mcg Route  IntraVENous Administered By  Santos Qiu RN              ferrous sulfate tablet 325 mg       Admin Date  03/14/2021 Action  Given Dose  325 mg Route  Oral Administered By  Racquel Stephens RN               Admin Date  03/14/2021 Action  Given Dose  325 mg Route  Oral Administered By  Racquel Stephens RN               Admin Date  03/15/2021 Action  Given Dose  325 mg Route  Oral Administered By  Racquel Stephens RN              folic acid (FOLVITE) tablet 1 mg       Admin Date  03/14/2021 Action  Given Dose  1 mg Route  Oral Administered By  Racquel Stephens RN               Admin Date  03/15/2021 Action  Given Dose  1 mg Route  Oral Administered By  Racquel Stephens RN Admin Date  03/16/2021 Action  Given Dose  1 mg Route  Oral Administered By  Mitzy Melendez              furosemide (LASIX) injection 20 mg       Admin Date  03/15/2021 Action  Given Dose  20 mg Route  IntraVENous Administered By  Geoffrey Perez RN              furosemide (LASIX) injection 40 mg       Admin Date  03/13/2021 Action  Given Dose  40 mg Route  IntraVENous Administered By  Geoffrey Young RN               Admin Date  03/13/2021 Action  Given Dose  40 mg Route  IntraVENous Administered By  Luna Barker RN               Admin Date  03/14/2021 Action  Given Dose  40 mg Route  IntraVENous Administered By  Geoffrey Perez RN               Admin Date  03/14/2021 Action  Given Dose  40 mg Route  IntraVENous Administered By  Luna Barker RN               Admin Date  03/15/2021 Action  Given Dose  40 mg Route  IntraVENous Administered By  Ledy Adrian RN               Admin Date  03/16/2021 Action  Given Dose  40 mg Route  IntraVENous Administered By  Mitzy Melendez              HYDROcodone-acetaminophen [facility]) 5-325 mg per tablet 1 Tab       Admin Date  03/14/2021 Action  Given Dose  1 Tab Route  Oral Administered By  Geoffrey Perez RN               Admin Date  03/15/2021 Action  Given Dose  1 Tab Route  Oral Administered By  Luna Barker RN              insulin lispro (HUMALOG) injection       Admin Date  03/13/2021 Action  Given Dose  2 Units Route  SubCUTAneous Administered By  Luna Barker RN               Admin Date  03/14/2021 Action  Given Dose  2 Units Route  SubCUTAneous Administered By  Geoffrey Perez RN               Admin Date  03/14/2021 Action  Given Dose  2 Units Route  SubCUTAneous Administered By  Geoffrey Perez RN               Admin Date  03/14/2021 Action  Given Dose  4 Units Route  SubCUTAneous Administered By  Luna Barker RN               Admin Date  03/15/2021 Action  Given Dose  2 Units Route  SubCUTAneous Administered By  Geoffrey Perez RN Admin Date  03/15/2021 Action  Given Dose  4 Units Route  SubCUTAneous Administered By  Lilia Gonzales RN               Admin Date  03/15/2021 Action  Given Dose  2 Units Route  SubCUTAneous Administered By  Emelyn Ortiz RN               Admin Date  03/16/2021 Action  Given Dose  4 Units Route  SubCUTAneous Administered By  Jason Mcdonough              levothyroxine (SYNTHROID) tablet 137 mcg       Admin Date  03/14/2021 Action  Given Dose  137 mcg Route  Oral Administered By  Lilia Gonzales RN               Admin Date  03/15/2021 Action  Given Dose  137 mcg Route  Oral Administered By  Lilia Gonzales RN               Admin Date  03/16/2021 Action  Given Dose  137 mcg Route  Oral Administered By  Emelyn Ortiz RN              midodrine (PROAMATINE) tablet 10 mg       Admin Date  03/14/2021 Action  Given Dose  10 mg Route  Oral Administered By  Lilia Gonzales RN               Admin Date  03/14/2021 Action  Given Dose  10 mg Route  Oral Administered By  Lilia Gonzales RN               Admin Date  03/15/2021 Action  Given Dose  10 mg Route  Oral Administered By  Lilia Gonzales RN               Admin Date  03/15/2021 Action  Given Dose  10 mg Route  Oral Administered By  Lilia Gonzales RN               Admin Date  03/15/2021 Action  Given Dose  10 mg Route  Oral Administered By  Lilia Gonzales RN               Admin Date  03/16/2021 Action  Given Dose  10 mg Route  Oral Administered By  Kirk Baldwin Date  03/16/2021 Action  Given Dose  10 mg Route  Oral Administered By  Kirk Baldwin Date  03/16/2021 Action  Given Dose  10 mg Route  Oral Administered By  Jason Mcdonough              naloxone [facility]) injection 2 mg       Admin Date  03/13/2021 Action  Given Dose  2 mg Route  IntraVENous Administered By  Adolph Leal RN              NOREPINephrine (LEVOPHED) 4 mg in 5% dextrose 250 mL infusion       Admin Date  03/14/2021 Action  New Bag Dose  4 mcg/min Rate  15 mL/hr Route  IntraVENous Administered By  Moris Chavez RN               Admin Date  03/14/2021 Action  Rate Change Dose  6 mcg/min Rate  22.5 mL/hr Route  IntraVENous Administered By  Moris Chavez RN               Admin Date  03/14/2021 Action  Rate Change Dose  4 mcg/min Rate  15 mL/hr Route  IntraVENous Administered By  Moris Chavez RN               Admin Date  03/14/2021 Action  Rate Change Dose  2 mcg/min Rate  7.5 mL/hr Route  IntraVENous Administered By  Moris Chavez RN               Admin Date  03/14/2021 Action  Rate Change Dose  1 mcg/min Rate  3.8 mL/hr Route  IntraVENous Administered By  Moris Chavez RN               Admin Date  03/14/2021 Action  Restarted Dose  2 mcg/min Rate  7.5 mL/hr Route  IntraVENous Administered By  Moris Chavez RN               Admin Date  03/14/2021 Action  Rate Change Dose  4 mcg/min Rate  15 mL/hr Route  IntraVENous Administered By  Moris Chavez RN               Admin Date  03/15/2021 Action  Rate Change Dose  2 mcg/min Rate  7.5 mL/hr Route  IntraVENous Administered By  Scot Zhou RN              ondansetron [facility]) injection 4 mg       Admin Date  03/13/2021 Action  Given Dose  4 mg Route  IntraVENous Administered By  Marge Solomon RN              pantoprazole (PROTONIX) tablet 40 mg       Admin Date  03/14/2021 Action  Given Dose  40 mg Route  Oral Administered By  Moris Chavez RN               Admin Date  03/15/2021 Action  Given Dose  40 mg Route  Oral Administered By  Moris Chavez RN               Admin Date  03/16/2021 Action  Given Dose  40 mg Route  Oral Administered By  Greer Haider              perflutren lipid microspheres (DEFINITY) in NS bolus IV       Admin Date  03/13/2021 Action  Given Dose  1 mL Route  IntraVENous Administered By  ANTHONY Rosales              potassium chloride (K-DUR, KLOR-CON) SR tablet 40 mEq       Admin Date  03/15/2021 Action  Given Dose  40 mEq Route  Oral Administered By  Noel Ac RN               Admin Date  03/16/2021 Action  Given Dose  40 mEq Route  Oral Administered By  Yani Dukes              potassium chloride 40 mEq IVPB       Admin Date  03/15/2021 Action  New Bag Dose  40 mEq Rate  25 mL/hr Route  IntraVENous Administered By  Kaia Waters RN              pregabalin (LYRICA) capsule 300 mg       Admin Date  03/14/2021 Action  Given Dose  300 mg Route  Oral Administered By  Noel Ac RN               Admin Date  03/14/2021 Action  Given Dose  300 mg Route  Oral Administered By  Noel Ac RN               Admin Date  03/15/2021 Action  Given Dose  300 mg Route  Oral Administered By  Noel Ac RN               Admin Date  03/15/2021 Action  Given Dose  300 mg Route  Oral Administered By  Avi Sever, RN               Admin Date  03/16/2021 Action  Given Dose  300 mg Route  Oral Administered By  Yani Dukes              sertraline (ZOLOFT) tablet 300 mg       Admin Date  03/14/2021 Action  Given Dose  300 mg Route  Oral Administered By  Noel Ac RN               Admin Date  03/15/2021 Action  Given Dose  300 mg Route  Oral Administered By  Noel Ac RN               Admin Date  03/16/2021 Action  Given Dose  300 mg Route  Oral Administered By  [facility] Roseline              sodium chloride (NS) flush 5-40 mL       Admin Date  03/13/2021 Action  Given Dose  10 mL Route  IntraVENous Administered By  Nori Villavicencio RN               Admin Date  03/13/2021 Action  Given Dose  30 mL Route  IntraVENous Administered By  Nori Villavicencio RN               Admin Date  03/13/2021 Action  Given Dose  40 mL Route  IntraVENous Administered By  Kaia Waters RN               Admin Date  03/14/2021 Action  Given Dose  40 mL Route  IntraVENous Administered By  Kaia Waters RN               Admin Date  03/14/2021 Action  Given Dose  10 mL Route  IntraVENous Administered By  Noel Ac RN               Admin Date  03/14/2021 Action  Given Dose  40 mL Route  IntraVENous Administered By  Yves Long RN               Admin Date  03/15/2021 Action  Given Dose  40 mL Route  IntraVENous Administered By  Yves Long, RN               Admin Date  03/15/2021 Action  Given Dose  10 mL Route  IntraVENous Administered By  Christine Sanders, HERB               Admin Date  03/15/2021 Action  Given Dose  10 mL Route  IntraVENous Administered By  Ana Heath, RN               Admin Date  03/16/2021 Action  Given Dose  10 mL Route  IntraVENous Administered By  Evert Blanc              sodium chloride 0.9 % bolus infusion 250 mL       Admin Date  03/14/2021 Action  New Bag Dose  250 mL Rate  250 mL/hr Route  IntraVENous Administered By  Christine Sanders, HERB              tiZANidine (ZANAFLEX) tablet 4 mg       Admin Date  03/14/2021 Action  Given Dose  4 mg Route  Oral Administered By  Yves Long, HERB               Admin Date  03/14/2021 Action  Given Dose  4 mg Route  Oral Administered By  Christine Sanders, RN               Admin Date  03/14/2021 Action  Given Dose  4 mg Route  Oral Administered By  Christine Sanders, RN               Admin Date  03/14/2021 Action  Given Dose  4 mg Route  Oral Administered By  Yves Long, RN               Admin Date  03/15/2021 Action  Given Dose  4 mg Route  Oral Administered By  Christine Sanders, RN               Admin Date  03/15/2021 Action  Given Dose  4 mg Route  Oral Administered By  Christine Sanders RN               Admin Date  03/15/2021 Action  Given Dose  4 mg Route  Oral Administered By  Ana Heath, RN               Admin Date  03/16/2021 Action  Given Dose  4 mg Route  Oral Administered By  Dillon Hernandezon Date  03/16/2021 Action  Given Dose  4 mg Route  Oral Administered By  Evert Blanc              traZODone (DESYREL) tablet 150 mg       Admin Date  03/14/2021 Action  Given Dose  150 mg Route  Oral Administered By  Yves Long, RN               Admin Date  03/15/2021 Action  Given Dose  150 mg Route  Oral Administered By  Nusrat Ramon RN              tuberculin injection 5 Units       Admin Date  03/15/2021 Action  Give PPD Dose  5 Units Route  IntraDERMal Administered By  Oseas Rowley RN              vancomycin (VANCOCIN) 2500 mg in  mL infusion       Admin Date  03/13/2021 Action  Given Dose  2,500 mg Rate  200 mL/hr Route  IntraVENous Administered By  Nel Gonzalez RN              zonisamide (ZONEGRAN) capsule 300 mg       Admin Date  03/14/2021 Action  Given Dose  300 mg Route  Oral Administered By  Dunia Alfred RN               Admin Date  03/15/2021 Action  Given Dose  300 mg Route  Oral Administered By  Nusrat Ramon RN                        Problem List:     Hospital Problems as of 12/5/2021 Date Reviewed: 11/16/2021          Codes Class Noted - Resolved POA    Dehydration ICD-10-CM: E86.0  ICD-9-CM: 276.51  12/5/2021 - Present Yes        Nausea & vomiting ICD-10-CM: R11.2  ICD-9-CM: 787.01  12/5/2021 - Present Yes        * (Principal) [facility] ICD-10-CM: N17.9  ICD-9-CM: 584.9  12/5/2021 - Present Yes        Spinal stenosis of lumbar region with neurogenic claudication ICD-10-CM: M48.062  ICD-9-CM: 724.03  3/22/2021 - Present Yes        Hyperlipidemia ICD-10-CM: E78.5  ICD-9-CM: 272.4  Unknown - Present Yes        Essential (primary) hypertension ICD-10-CM: I10  ICD-9-CM: 401.9  Unknown - Present Yes        Morbid obesity ([facility] ICD-10-CM: E66.01  ICD-9-CM: 278.01  8/25/2015 - Present Yes                 Signed By: Chalo Montemayor MD   [facility]    December 5, 2021  4:22 PM

## 2021-12-06 ENCOUNTER — APPOINTMENT (OUTPATIENT)
Dept: ULTRASOUND IMAGING | Age: 61
DRG: 683 | End: 2021-12-06
Attending: STUDENT IN AN ORGANIZED HEALTH CARE EDUCATION/TRAINING PROGRAM

## 2021-12-06 LAB
ALBUMIN SERPL-MCNC: 2.9 G/DL (ref 3.2–4.6)
ALBUMIN/GLOB SERPL: 1 {RATIO} (ref 1.2–3.5)
ALP SERPL-CCNC: 47 U/L (ref 50–136)
ALT SERPL-CCNC: 54 U/L (ref 12–65)
ANION GAP SERPL CALC-SCNC: 8 MMOL/L (ref 7–16)
ANION GAP SERPL CALC-SCNC: 8 MMOL/L (ref 7–16)
APPEARANCE UR: ABNORMAL
AST SERPL-CCNC: 57 U/L (ref 15–37)
BACTERIA URNS QL MICRO: ABNORMAL /HPF
BASOPHILS # BLD: 0 K/UL (ref 0–0.2)
BASOPHILS NFR BLD: 1 % (ref 0–2)
BILIRUB SERPL-MCNC: 0.8 MG/DL (ref 0.2–1.1)
BILIRUB UR QL: ABNORMAL
BUN SERPL-MCNC: 32 MG/DL (ref 8–23)
BUN SERPL-MCNC: 32 MG/DL (ref 8–23)
CALCIUM SERPL-MCNC: 8.5 MG/DL (ref 8.3–10.4)
CALCIUM SERPL-MCNC: 8.8 MG/DL (ref 8.3–10.4)
CASTS URNS QL MICRO: ABNORMAL /LPF
CHLORIDE SERPL-SCNC: 105 MMOL/L (ref 98–107)
CHLORIDE SERPL-SCNC: 108 MMOL/L (ref 98–107)
CO2 SERPL-SCNC: 24 MMOL/L (ref 21–32)
CO2 SERPL-SCNC: 25 MMOL/L (ref 21–32)
COLOR UR: ABNORMAL
COVID-19 RAPID TEST, COVR: NOT DETECTED
CREAT SERPL-MCNC: 3.07 MG/DL (ref 0.6–1)
CREAT SERPL-MCNC: 3.66 MG/DL (ref 0.6–1)
CREAT UR-MCNC: 446 MG/DL
CRYSTALS URNS QL MICRO: ABNORMAL /LPF
DIFFERENTIAL METHOD BLD: ABNORMAL
EOSINOPHIL # BLD: 0.1 K/UL (ref 0–0.8)
EOSINOPHIL NFR BLD: 1 % (ref 0.5–7.8)
EPI CELLS #/AREA URNS HPF: ABNORMAL /HPF
ERYTHROCYTE [DISTWIDTH] IN BLOOD BY AUTOMATED COUNT: 12.2 % (ref 11.9–14.6)
GLOBULIN SER CALC-MCNC: 2.8 G/DL (ref 2.3–3.5)
GLUCOSE SERPL-MCNC: 105 MG/DL (ref 65–100)
GLUCOSE SERPL-MCNC: 109 MG/DL (ref 65–100)
GLUCOSE UR STRIP.AUTO-MCNC: NEGATIVE MG/DL
HCT VFR BLD AUTO: 33.1 % (ref 35.8–46.3)
HGB BLD-MCNC: 10.8 G/DL (ref 11.7–15.4)
HGB UR QL STRIP: NEGATIVE
IMM GRANULOCYTES # BLD AUTO: 0 K/UL (ref 0–0.5)
IMM GRANULOCYTES NFR BLD AUTO: 0 % (ref 0–5)
KETONES UR QL STRIP.AUTO: ABNORMAL MG/DL
LEUKOCYTE ESTERASE UR QL STRIP.AUTO: ABNORMAL
LYMPHOCYTES # BLD: 1.4 K/UL (ref 0.5–4.6)
LYMPHOCYTES NFR BLD: 25 % (ref 13–44)
MAGNESIUM SERPL-MCNC: 1.8 MG/DL (ref 1.8–2.4)
MCH RBC QN AUTO: 29.1 PG (ref 26.1–32.9)
MCHC RBC AUTO-ENTMCNC: 32.6 G/DL (ref 31.4–35)
MCV RBC AUTO: 89.2 FL (ref 79.6–97.8)
MONOCYTES # BLD: 0.6 K/UL (ref 0.1–1.3)
MONOCYTES NFR BLD: 11 % (ref 4–12)
NEUTS SEG # BLD: 3.5 K/UL (ref 1.7–8.2)
NEUTS SEG NFR BLD: 62 % (ref 43–78)
NITRITE UR QL STRIP.AUTO: NEGATIVE
NRBC # BLD: 0 K/UL (ref 0–0.2)
OTHER OBSERVATIONS,UCOM: ABNORMAL
PH UR STRIP: 5 [PH] (ref 5–9)
PLATELET # BLD AUTO: 235 K/UL (ref 150–450)
PMV BLD AUTO: 8.7 FL (ref 9.4–12.3)
POTASSIUM SERPL-SCNC: 3.4 MMOL/L (ref 3.5–5.1)
POTASSIUM SERPL-SCNC: 3.5 MMOL/L (ref 3.5–5.1)
PROT SERPL-MCNC: 5.7 G/DL (ref 6.3–8.2)
PROT UR STRIP-MCNC: ABNORMAL MG/DL
PROT UR-MCNC: 62 MG/DL
PROT/CREAT UR-RTO: 0.1
RBC # BLD AUTO: 3.71 M/UL (ref 4.05–5.2)
RBC #/AREA URNS HPF: ABNORMAL /HPF
SODIUM SERPL-SCNC: 138 MMOL/L (ref 136–145)
SODIUM SERPL-SCNC: 140 MMOL/L (ref 136–145)
SODIUM UR-SCNC: 43 MMOL/L
SOURCE, COVRS: NORMAL
SP GR UR REFRACTOMETRY: 1.02 (ref 1–1.02)
UROBILINOGEN UR QL STRIP.AUTO: 1 EU/DL (ref 0.2–1)
WBC # BLD AUTO: 5.6 K/UL (ref 4.3–11.1)
WBC URNS QL MICRO: ABNORMAL /HPF

## 2021-12-06 PROCEDURE — 65270000029 HC RM PRIVATE

## 2021-12-06 PROCEDURE — 85025 COMPLETE CBC W/AUTO DIFF WBC: CPT

## 2021-12-06 PROCEDURE — 84300 ASSAY OF URINE SODIUM: CPT

## 2021-12-06 PROCEDURE — 81001 URINALYSIS AUTO W/SCOPE: CPT

## 2021-12-06 PROCEDURE — 74011250636 HC RX REV CODE- 250/636: Performed by: HOSPITALIST

## 2021-12-06 PROCEDURE — 74011250636 HC RX REV CODE- 250/636: Performed by: STUDENT IN AN ORGANIZED HEALTH CARE EDUCATION/TRAINING PROGRAM

## 2021-12-06 PROCEDURE — 76775 US EXAM ABDO BACK WALL LIM: CPT

## 2021-12-06 PROCEDURE — 87086 URINE CULTURE/COLONY COUNT: CPT

## 2021-12-06 PROCEDURE — 97161 PT EVAL LOW COMPLEX 20 MIN: CPT

## 2021-12-06 PROCEDURE — 83735 ASSAY OF MAGNESIUM: CPT

## 2021-12-06 PROCEDURE — 74011250637 HC RX REV CODE- 250/637: Performed by: FAMILY MEDICINE

## 2021-12-06 PROCEDURE — C9113 INJ PANTOPRAZOLE SODIUM, VIA: HCPCS | Performed by: STUDENT IN AN ORGANIZED HEALTH CARE EDUCATION/TRAINING PROGRAM

## 2021-12-06 PROCEDURE — 74011000250 HC RX REV CODE- 250: Performed by: STUDENT IN AN ORGANIZED HEALTH CARE EDUCATION/TRAINING PROGRAM

## 2021-12-06 PROCEDURE — 87635 SARS-COV-2 COVID-19 AMP PRB: CPT

## 2021-12-06 PROCEDURE — 97530 THERAPEUTIC ACTIVITIES: CPT

## 2021-12-06 PROCEDURE — 36415 COLL VENOUS BLD VENIPUNCTURE: CPT

## 2021-12-06 PROCEDURE — 84156 ASSAY OF PROTEIN URINE: CPT

## 2021-12-06 PROCEDURE — 74011250637 HC RX REV CODE- 250/637: Performed by: HOSPITALIST

## 2021-12-06 PROCEDURE — 80053 COMPREHEN METABOLIC PANEL: CPT

## 2021-12-06 RX ORDER — GABAPENTIN 300 MG/1
300 CAPSULE ORAL 2 TIMES DAILY
Status: DISCONTINUED | OUTPATIENT
Start: 2021-12-06 | End: 2021-12-08

## 2021-12-06 RX ORDER — NABUMETONE 750 MG/1
750 TABLET, FILM COATED ORAL 2 TIMES DAILY
COMMUNITY

## 2021-12-06 RX ADMIN — CYCLOBENZAPRINE 10 MG: 10 TABLET, FILM COATED ORAL at 12:18

## 2021-12-06 RX ADMIN — Medication 10 ML: at 13:33

## 2021-12-06 RX ADMIN — SODIUM CHLORIDE 40 MG: 9 INJECTION, SOLUTION INTRAMUSCULAR; INTRAVENOUS; SUBCUTANEOUS at 15:51

## 2021-12-06 RX ADMIN — ATORVASTATIN CALCIUM 40 MG: 40 TABLET, FILM COATED ORAL at 21:21

## 2021-12-06 RX ADMIN — ZOLPIDEM TARTRATE 5 MG: 5 TABLET ORAL at 21:21

## 2021-12-06 RX ADMIN — Medication 500 MG: at 17:58

## 2021-12-06 RX ADMIN — SODIUM CHLORIDE, SODIUM LACTATE, POTASSIUM CHLORIDE, AND CALCIUM CHLORIDE 250 ML/HR: 600; 310; 30; 20 INJECTION, SOLUTION INTRAVENOUS at 13:26

## 2021-12-06 RX ADMIN — FAMOTIDINE 20 MG: 20 TABLET ORAL at 17:51

## 2021-12-06 RX ADMIN — ONDANSETRON 4 MG: 4 TABLET, ORALLY DISINTEGRATING ORAL at 11:01

## 2021-12-06 RX ADMIN — Medication 10 ML: at 21:55

## 2021-12-06 RX ADMIN — ENOXAPARIN SODIUM 30 MG: 100 INJECTION SUBCUTANEOUS at 08:35

## 2021-12-06 RX ADMIN — SODIUM CHLORIDE, SODIUM LACTATE, POTASSIUM CHLORIDE, AND CALCIUM CHLORIDE 150 ML/HR: 600; 310; 30; 20 INJECTION, SOLUTION INTRAVENOUS at 18:03

## 2021-12-06 RX ADMIN — Medication 10 ML: at 21:23

## 2021-12-06 RX ADMIN — SODIUM CHLORIDE, SODIUM LACTATE, POTASSIUM CHLORIDE, AND CALCIUM CHLORIDE 100 ML/HR: 600; 310; 30; 20 INJECTION, SOLUTION INTRAVENOUS at 06:26

## 2021-12-06 RX ADMIN — Medication 500 MG: at 08:36

## 2021-12-06 RX ADMIN — GABAPENTIN 300 MG: 300 CAPSULE ORAL at 21:21

## 2021-12-06 RX ADMIN — Medication 10 ML: at 07:00

## 2021-12-06 RX ADMIN — FAMOTIDINE 20 MG: 20 TABLET ORAL at 08:36

## 2021-12-06 RX ADMIN — CYCLOBENZAPRINE 10 MG: 10 TABLET, FILM COATED ORAL at 06:24

## 2021-12-06 NOTE — PROGRESS NOTES
Hospitalist Progress Note   Admit Date:  2021 12:55 PM   Name:  Lorrie Rodriguez   Age:  57 y.o. Sex:  female  :  1960   MRN:  [id number]   Room:  218/01    Presenting Complaint: Vomiting    Reason(s) for Admission: Acute renal failure (ARF) ([facility] [address] [N17.9]  Dehydration [E86.0]  Nausea & vomiting [R11.2]     Hospital Course & Interval History:   Patient is a 58 y/o female with PMH HTN, obesity, chronic back pain s/p recent surgery, dyslipidemia who presented to ED with nausea, vomiting, diarrhea of 1-week duration with associated poor oral intake. ED workup with creatinine of 2.8, tachycardic 100-110. Patient admitted for MICHELLE due to dehydration from N/V/D of prolonged-duration. Subjective (21): Patient alert, oriented, sitting in bed eating breakfast. No nausea, vomiting, diarrhea overnight. Currently denies nausea, vomiting, abdominal pain. Tolerating breakfast well.     Assessment & Plan:     Principal Problem:  Acute renal failure (ARF) (HCC), worsening  -sCr 2.8 on presentation with baseline < 1.0 with etiology likely hypovolemic/dehydration from week-long history of nausea/vomiting/diarrhea/poor po intake  -Home benazepril, triamterene, HCTZ, gabapentin held, continue to avoid nephrotoxic medications  -sCr to 3.66 today, will titrate up IVF with goal UOP 50 ml/hr, strict I&O's, check additional urine labs and renal ultrasound    Viral gastroenteritis  Nausea & vomiting, improving  Dehydration, improving  -Symptomatic treatment with IVF, increase IVF today  -Zofran prn nausea  -Florastor BID  -Currently without nausea, vomiting, diarrhea, abdominal pain; tolerating PO intake this morning    Active Problems:  Dyslipidemia  -Continue statin    Essential (primary) hypertension  -BP low on admission 97/65, anti-hypertensives held  -BP remains low/norm, can re-start medications as BP and renal function stabilize    Morbid obesity (HCC)   -Increases all-cause mortality, counseling provided for dietary and lifestyle modifications    Spinal stenosis of lumbar region with neurogenic claudication  Lumbar Radiculopathy  -s/p L4-5 transforaminal lumbar interbody fusion with Dr. Alan Bryant 9/8/21  -Gabapentin on hold for MICHELLE    Dispo/Discharge Planning:  Pending clinical course    Diet:  ADULT DIET Regular;  Low Sodium (2 gm)  DVT PPx: Lovenox  Code status: Full Code    Hospital Problems as of 12/6/2021 Date Reviewed: 11/16/2021          Codes Class Noted - Resolved POA    Dehydration ICD-10-CM: E86.0  ICD-9-CM: 276.51  12/5/2021 - Present Yes        Nausea & vomiting ICD-10-CM: R11.2  ICD-9-CM: 787.01  12/5/2021 - Present Yes        * (Principal) Acute renal failure (ARF) ([facility] ICD-10-CM: N17.9  ICD-9-CM: 584.9  12/5/2021 - Present Yes        Viral gastroenteritis ICD-10-CM: A08.4  ICD-9-CM: 008.8  12/5/2021 - Present Unknown        Spinal stenosis of lumbar region with neurogenic claudication ICD-10-CM: M48.062  ICD-9-CM: 724.03  3/22/2021 - Present Yes        Hyperlipidemia ICD-10-CM: E78.5  ICD-9-CM: 272.4  Unknown - Present Yes        Essential (primary) hypertension ICD-10-CM: I10  ICD-9-CM: 401.9  Unknown - Present Yes        Morbid obesity ([facility] ICD-10-CM: E66.01  ICD-9-CM: 278.01  8/25/2015 - Present Yes              Objective:     Patient Vitals for the past 24 hrs:   Temp Pulse Resp BP SpO2   12/06/21 0618 97.7 °F (36.5 °C) 97 20 96/60 94 %   12/06/21 0350    100/60    12/06/21 0345 98.1 °F (36.7 °C) 92 16 (!) 93/59 96 %   12/05/21 2132 98 °F (36.7 °C) 97 20 92/64 99 %   12/05/21 2000    101/73    12/05/21 1958     100 %   12/05/21 1954     97 %   12/05/21 1945    102/76    12/05/21 1930    111/79    12/05/21 1927     99 %   12/05/21 1916     99 %   12/05/21 1915    106/77    12/05/21 1904     100 %   12/05/21 1900    116/84 100 %   12/05/21 1846     99 %   12/05/21 1303 98.7 °F (37.1 °C) (!) 112 18 97/65 99 %     Oxygen Therapy  O2 Sat (%): 94 % (12/06/21 0618)  Pulse via Oximetry: 107 beats per minute (12/05/21 1958)  O2 Device: None (Room air) (12/05/21 2132)    Estimated body mass index is 38.05 kg/m² as calculated from the following:    Height as of this encounter: 5' 7\" (1.702 m). Weight as of this encounter: 110.2 kg (242 lb 15.2 oz). Intake/Output Summary (Last 24 hours) at 12/6/2021 0746  Last data filed at 12/6/2021 0626  Gross per 24 hour   Intake 895 ml   Output    Net 895 ml         Physical Exam:   Blood pressure 96/60, pulse 97, temperature 97.7 °F (36.5 °C), resp. rate 20, height 5' 7\" (1.702 m), weight 110.2 kg (242 lb 15.2 oz), last menstrual period 10/06/1992, SpO2 94 %. General:    Well nourished. No overt distress. Alert. Conversational. Calm. Obese  Head:  Normocephalic, atraumatic  Eyes:  Sclerae appear normal.  Pupils equally round. Glasses intact. ENT:  Nares appear normal, no drainage. Moist oral mucosa  Neck:  No restricted ROM. Trachea midline   CV:   RRR. No m/r/g. No jugular venous distension. Lungs:   CTAB. No wheezing, rhonchi, or rales. Respirations even, unlabored  Abdomen: Bowel sounds normoactive. No tenderness to palpation in all quadrants. Soft, non-distended. Extremities: No cyanosis or clubbing. No edema  Skin:     No rashes and normal coloration. Warm and dry. Neuro:  CN II-XII grossly intact. Sensation intact. A&Ox3  Psych:  Normal mood and affect.       I have reviewed ordered lab tests and independently visualized imaging below:    Recent Labs:  Recent Results (from the past 48 hour(s))   CBC WITH AUTOMATED DIFF    Collection Time: 12/05/21  1:05 PM   Result Value Ref Range    WBC 7.3 4.3 - 11.1 K/uL    RBC 4.39 4.05 - 5.2 M/uL    HGB 13.0 11.7 - 15.4 g/dL    HCT 39.1 35.8 - 46.3 %    MCV 89.1 79.6 - 97.8 FL    MCH 29.6 26.1 - 32.9 PG    MCHC 33.2 31.4 - 35.0 g/dL    RDW 12.1 11.9 - 14.6 %    PLATELET 916 562 - 630 K/uL    MPV 8.7 (L) 9.4 - 12.3 FL    ABSOLUTE NRBC 0.00 0.0 - 0.2 K/uL DF AUTOMATED      NEUTROPHILS 72 43 - 78 %    LYMPHOCYTES 18 13 - 44 %    MONOCYTES 9 4.0 - 12.0 %    EOSINOPHILS 1 0.5 - 7.8 %    BASOPHILS 0 0.0 - 2.0 %    IMMATURE GRANULOCYTES 0 0.0 - 5.0 %    ABS. NEUTROPHILS 5.3 1.7 - 8.2 K/UL    ABS. LYMPHOCYTES 1.3 0.5 - 4.6 K/UL    ABS. MONOCYTES 0.7 0.1 - 1.3 K/UL    ABS. EOSINOPHILS 0.1 0.0 - 0.8 K/UL    ABS. BASOPHILS 0.0 0.0 - 0.2 K/UL    ABS. IMM. GRANS. 0.0 0.0 - 0.5 K/UL   METABOLIC PANEL, COMPREHENSIVE    Collection Time: 12/05/21  1:05 PM   Result Value Ref Range    Sodium 137 136 - 145 mmol/L    Potassium 3.7 3.5 - 5.1 mmol/L    Chloride 106 98 - 107 mmol/L    CO2 24 21 - 32 mmol/L    Anion gap 7 7 - 16 mmol/L    Glucose 113 (H) 65 - 100 mg/dL    BUN 27 (H) 8 - 23 MG/DL    Creatinine 2.89 (H) 0.6 - 1.0 MG/DL    GFR est AA 21 (L) >60 ml/min/1.73m2    GFR est non-AA 18 (L) >60 ml/min/1.73m2    Calcium 9.0 8.3 - 10.4 MG/DL    Bilirubin, total 0.9 0.2 - 1.1 MG/DL    ALT (SGPT) 67 (H) 12 - 65 U/L    AST (SGOT) 75 (H) 15 - 37 U/L    Alk. phosphatase 61 50 - 136 U/L    Protein, total 7.3 6.3 - 8.2 g/dL    Albumin 3.7 3.2 - 4.6 g/dL    Globulin 3.6 (H) 2.3 - 3.5 g/dL    A-G Ratio 1.0 (L) 1.2 - 3.5     MAGNESIUM    Collection Time: 12/05/21  1:05 PM   Result Value Ref Range    Magnesium 1.8 1.8 - 2.4 mg/dL   LIPASE    Collection Time: 12/05/21  1:05 PM   Result Value Ref Range    Lipase 144 73 - 747 U/L   METABOLIC PANEL, COMPREHENSIVE    Collection Time: 12/06/21  4:58 AM   Result Value Ref Range    Sodium 140 136 - 145 mmol/L    Potassium 3.5 3.5 - 5.1 mmol/L    Chloride 108 (H) 98 - 107 mmol/L    CO2 24 21 - 32 mmol/L    Anion gap 8 7 - 16 mmol/L    Glucose 109 (H) 65 - 100 mg/dL    BUN 32 (H) 8 - 23 MG/DL    Creatinine 3.66 (H) 0.6 - 1.0 MG/DL    GFR est AA 16 (L) >60 ml/min/1.73m2    GFR est non-AA 13 (L) >60 ml/min/1.73m2    Calcium 8.5 8.3 - 10.4 MG/DL    Bilirubin, total 0.8 0.2 - 1.1 MG/DL    ALT (SGPT) 54 12 - 65 U/L    AST (SGOT) 57 (H) 15 - 37 U/L    Alk. phosphatase 47 (L) 50 - 136 U/L    Protein, total 5.7 (L) 6.3 - 8.2 g/dL    Albumin 2.9 (L) 3.2 - 4.6 g/dL    Globulin 2.8 2.3 - 3.5 g/dL    A-G Ratio 1.0 (L) 1.2 - 3.5     MAGNESIUM    Collection Time: 12/06/21  4:58 AM   Result Value Ref Range    Magnesium 1.8 1.8 - 2.4 mg/dL   CBC WITH AUTOMATED DIFF    Collection Time: 12/06/21  4:58 AM   Result Value Ref Range    WBC 5.6 4.3 - 11.1 K/uL    RBC 3.71 (L) 4.05 - 5.2 M/uL    HGB 10.8 (L) 11.7 - 15.4 g/dL    HCT 33.1 (L) 35.8 - 46.3 %    MCV 89.2 79.6 - 97.8 FL    MCH 29.1 26.1 - 32.9 PG    MCHC 32.6 31.4 - 35.0 g/dL    RDW 12.2 11.9 - 14.6 %    PLATELET 824 306 - 719 K/uL    MPV 8.7 (L) 9.4 - 12.3 FL    ABSOLUTE NRBC 0.00 0.0 - 0.2 K/uL    DF AUTOMATED      NEUTROPHILS 62 43 - 78 %    LYMPHOCYTES 25 13 - 44 %    MONOCYTES 11 4.0 - 12.0 %    EOSINOPHILS 1 0.5 - 7.8 %    BASOPHILS 1 0.0 - 2.0 %    IMMATURE GRANULOCYTES 0 0.0 - 5.0 %    ABS. NEUTROPHILS 3.5 1.7 - 8.2 K/UL    ABS. LYMPHOCYTES 1.4 0.5 - 4.6 K/UL    ABS. MONOCYTES 0.6 0.1 - 1.3 K/UL    ABS. EOSINOPHILS 0.1 0.0 - 0.8 K/UL    ABS. BASOPHILS 0.0 0.0 - 0.2 K/UL    ABS. IMM. GRANS. 0.0 0.0 - 0.5 K/UL       All Micro Results     None          Other Studies:  No results found.     Current Meds:  Current Facility-Administered Medications   Medication Dose Route Frequency    sodium chloride (NS) flush 5-10 mL  5-10 mL IntraVENous Q8H    sodium chloride (NS) flush 5-10 mL  5-10 mL IntraVENous PRN    [Held by provider] atenoloL (TENORMIN) tablet 50 mg  50 mg Oral DAILY    atorvastatin (LIPITOR) tablet 40 mg  40 mg Oral QHS    cyclobenzaprine (FLEXERIL) tablet 10 mg  10 mg Oral TID PRN    sodium chloride (NS) flush 5-40 mL  5-40 mL IntraVENous Q8H    sodium chloride (NS) flush 5-40 mL  5-40 mL IntraVENous PRN    acetaminophen (TYLENOL) tablet 650 mg  650 mg Oral Q6H PRN    Or    acetaminophen (TYLENOL) suppository 650 mg  650 mg Rectal Q6H PRN    polyethylene glycol (MIRALAX) packet 17 g  17 g Oral DAILY PRN    ondansetron (ZOFRAN ODT) tablet 4 mg  4 mg Oral Q8H PRN    Or    ondansetron (ZOFRAN) injection 4 mg  4 mg IntraVENous Q6H PRN    enoxaparin (LOVENOX) injection 30 mg  30 mg SubCUTAneous DAILY    potassium chloride 10 mEq in 100 ml IVPB  10 mEq IntraVENous PRN    magnesium sulfate 2 g/50 ml IVPB (premix or compounded)  2 g IntraVENous PRN    famotidine (PEPCID) tablet 20 mg  20 mg Oral BID    alum-mag hydroxide-simeth (MYLANTA) oral suspension 15 mL  15 mL Oral Q6H PRN    lactated Ringers infusion  100 mL/hr IntraVENous CONTINUOUS    Saccharomyces boulardii (FLORASTOR) capsule 500 mg  500 mg Oral BID    influenza vaccine 2021-22 (6 mos+)(PF) (FLUARIX/FLULAVAL/FLUZONE QUAD) injection 0.5 mL  1 Each IntraMUSCular PRIOR TO DISCHARGE    LORazepam (ATIVAN) tablet 1 mg  1 mg Oral DAILY PRN    zolpidem (AMBIEN) tablet 5 mg  5 mg Oral QHS PRN     Labs, vital signs, diagnostic testing reviewed. Case discussed with patient, care team, Dr. Simba Duffy.     Signed:  Sola Mckeon NP

## 2021-12-06 NOTE — PROGRESS NOTES
END OF SHIFT NOTE:    INTAKE/OUTPUT  12/05 0701 - 12/06 0700  In: 238 [I.V.:635]  Out: -   Voiding: YES  Catheter: NO  Drain:              Flatus: Patient does have flatus present. Stool:  0 occurrences. Characteristics:       Emesis: 0 occurrences. Characteristics:        VITAL SIGNS  Patient Vitals for the past 12 hrs:   Temp Pulse Resp BP SpO2   12/06/21 0350    100/60    12/06/21 0345 98.1 °F (36.7 °C) 92 16 (!) 93/59 96 %   12/05/21 2132 98 °F (36.7 °C) 97 20 92/64 99 %   12/05/21 2000    101/73    12/05/21 1958     100 %   12/05/21 1954     97 %   12/05/21 1945    102/76    12/05/21 1930    111/79    12/05/21 1927     99 %   12/05/21 1916     99 %   12/05/21 1915    106/77    12/05/21 1904     100 %   12/05/21 1900    116/84 100 %   12/05/21 1846     99 %       Pain Assessment  Pain Intensity 1: 0 (12/06/21 0040)        Patient Stated Pain Goal: 0    Ambulating  Yes few steps in room    Shift report given to oncoming nurse at the bedside.     Ozzy Chino RN

## 2021-12-06 NOTE — PROGRESS NOTES
TRANSFER - IN REPORT:    Verbal report received from Mickey(name) on [place]  being received from er(unit) for routine progression of care      Report consisted of patients Situation, Background, Assessment and   Recommendations(SBAR). Information from the following report(s) Kardex was reviewed with the receiving nurse. Opportunity for questions and clarification was provided. Assessment completed upon patients arrival to unit and care assumed. Arrived awake,  Alert,oreinted x4. No skin breakdown.   Has  Old healed surgical scars

## 2021-12-06 NOTE — ED NOTES
TRANSFER - OUT REPORT:    Verbal report given to Noam White (name) on Brittaney Singer  being transferred to 218 (unit) for routine progression of care       Report consisted of patients Situation, Background, Assessment and   Recommendations(SBAR). Information from the following report(s) SBAR, ED Summary, [facility] and Recent Results was reviewed with the receiving nurse. Lines:   Peripheral IV 12/05/21 Right Antecubital (Active)   Site Assessment Clean, dry, & intact 12/05/21 1305   Phlebitis Assessment 0 12/05/21 1305   Infiltration Assessment 0 12/05/21 1305   Dressing Status Clean, dry, & intact 12/05/21 1305        Opportunity for questions and clarification was provided.       Patient transported with:   transport

## 2021-12-06 NOTE — PROGRESS NOTES
RNCM met with patient in room 211 to discuss discharge planning. Patient alert and oriented in all spheres. Patient lives with spouse in one level home with three steps to enter. Patient works PT with [employer] at [employer]. Patient has back surgery with Dr Sienna Kay in 09/2021 and often uses a walker at home. Confirmed demographics, PCP and emergency information. PT/OT evals ordered. CM will continue to follow for discharge needs. Care Management Interventions  PCP Verified by CM:  Yes (Dr Driss Lambert. [phone])  Mode of Transport at Discharge: Self  Transition of Care Consult (CM Consult): Discharge Planning  Discharge Durable Medical Equipment: Yes (DME: walker)  Physical Therapy Consult: Yes  Occupational Therapy Consult: Yes  Support Systems: Spouse/Significant Other, Child(hunter) (Daughter: Marylu White [phone])  Confirm Follow Up Transport: Self  The Plan for Transition of Care is Related to the Following Treatment Goals : return to baseline  The Patient and/or Patient Representative was Provided with a Choice of Provider and Agrees with the Discharge Plan?: Yes  Freedom of Choice List was Provided with Basic Dialogue that Supports the Patient's Individualized Plan of Care/Goals, Treatment Preferences and Shares the Quality Data Associated with the Providers?: Yes  Jean Resource Information Provided?: No  Discharge Location  Discharge Placement: Home with family assistance

## 2021-12-06 NOTE — PROGRESS NOTES
ACUTE PHYSICAL THERAPY GOALS:  (Developed with and agreed upon by patient and/or caregiver. )  LTG:  (1.)Ms. Jayson Kendrick will move from supine to sit and sit to supine  in bed with INDEPENDENCE within 7 treatment day(s). (2.)Ms. Jayson Kendrick will transfer from bed to chair and chair to bed with INDEPENDENT using the least restrictive device within 7 treatment day(s). (3.)Ms. Jayson Kendrick will ambulate with INDEPENDENT for 600 feet with the least restrictive device within 7 treatment day(s). (4.)Ms. Jayson Kendrick will perform standing static and dynamic balance activities x 23 minutes with SUPERVISION to improve safety within 7 treatment day(s). (5.)Ms. Jayson Kendrick will ambulate and/or perform functional activities for  23 consecutive minutes with stable vital signs and no rests required to improve activity tolerance within 7 treatment days.   ________________________________________________________________________________________________        PHYSICAL THERAPY ASSESSMENT: Initial Assessment, Daily Note and AM PT Treatment Day # 1      Valeria Chris is a 57 y.o. female   PRIMARY DIAGNOSIS: Acute renal failure (ARF) ([facility] [address]  Acute renal failure (ARF) (HCC) [N17.9]  Dehydration [E86.0]  Nausea & vomiting [R11.2]       Reason for Referral:    ICD-10: Treatment Diagnosis: Generalized Muscle Weakness (M62.81)  Difficulty in walking, Not elsewhere classified (R26.2)  INPATIENT: Payor: /     ASSESSMENT:     REHAB RECOMMENDATIONS:   Recommendation to date pending progress:  Setting:   No further skilled therapy   Equipment:    None     PRIOR LEVEL OF FUNCTION:  (Prior to Hospitalization) INITIAL/CURRENT LEVEL OF FUNCTION:  (Most Recently Demonstrated)   Bed Mobility:   Independent  Sit to Stand:   Independent  Transfers:   Independent  Gait/Mobility:   Independent Bed Mobility:   Standby Assistance  Sit to Stand:   Contact Guard Assistance  Transfers:   Standby Assistance  Gait/Mobility:   Standby Assistance     ASSESSMENT:  Ms. Rosendo Hernandez is a 57year old F who presents with SOB and n/v for the past week resulting in dehydration. Pt admitted with acute renal failure. Pt has PMH of HTN, obesity, chronic back pain s/p recent spine surgery in September 2021, dyslipidemia. Pt is very pleasant and willing to work with therapy. Pt had urgency to urinate and have BM upon entering room and transferred to [facility] with SBA/CGA in order to obtain a urine sample for RN. Pt states she lives with her  in 1 level home, no steps to enter, and Indep with all ADLs/gait/driving and working as a . Pt states she has a RW at home but does not use it and denies any recent falls. Pt able to perform hygiene Indep and demonstrate good standing dynamic balance while lower body dressing and then ambulate in room 20ft to bathroom to continue to have a BM, then walk again 10ft to chair with SBA. Pt had SOB during therapy session, however O2 saturations maintained >98% on RA, but BP was low but pt asymptomatic. Pt left reclined in chair with needs in reach and RN notified of pt's vitals. Pt presents as functioning below her baseline, with deficits in mobility including transfers, gait, balance, and activity tolerance. Pt will benefit from skilled therapy services to address stated deficits to promote return to highest level of function, independence, and safety. Will continue to follow. SUBJECTIVE:   Ms. Rosendo Hernandez states, \"I think I hold my breath when I do stuff. \"    SOCIAL HISTORY/LIVING ENVIRONMENT: lives with  in 1 level home, no steps.  PLOF, Indep with ADLs and gait, driving and working as . Has RW at home  Home Environment: Private residence  # Steps to Enter: 6  One/Two Story Residence: One story  Living Alone: No  Support Systems: Spouse/Significant Other  OBJECTIVE:     PAIN: VITAL SIGNS: LINES/DRAINS:   Pre Treatment:    Post Treatment: 0 Vital Signs  Level of Consciousness: Alert (0)  BP: 98/65  MAP (Calculated): 76  BP 1 Location: Left upper arm  BP 1 Method: Automatic  BP Patient Position: Sitting  O2 Sat (%): 98 %  O2 Device: None (Room air) IV  O2 Device: None (Room air)     GROSS EVALUATION:  B LE Within Functional Limits Abnormal/ Functional Abnormal/ Non-Functional (see comments) Not Tested Comments:   AROM [x] [] [] []    PROM [] [] [] []    Strength [x] [] [] []    Balance [x] [] [] []    Posture [] [] [] []    Sensation [] [] [] []    Coordination [] [] [] []    Tone [] [] [] []    Edema [] [] [] []    Activity Tolerance [] [x] [] [] SOB with activity and fatigued quickly    [] [] [] []      COGNITION/  PERCEPTION: Intact Impaired   (see comments) Comments:   Orientation [x] []    Vision [x] []    Hearing [x] []    Command Following [x] []    Safety Awareness [x] []     [] []      MOBILITY: I Mod I S SBA CGA Min Mod Max Total  NT x2 Comments:   Bed Mobility    Rolling [] [] [] [] [] [] [] [] [] [x] []    Supine to Sit [] [] [] [] [x] [] [] [] [] [] []    Scooting [] [] [] [x] [] [] [] [] [] [] []    Sit to Supine [] [] [] [] [] [] [] [] [] [x] []    Transfers    Sit to Stand [] [] [] [] [x] [] [] [] [] [] []    Bed to Chair [] [] [] [x] [] [] [] [] [] [] []    Stand to Sit [] [] [] [x] [] [] [] [] [] [] []    I=Independent, Mod I=Modified Independent, S=Supervision, SBA=Standby Assistance, CGA=Contact Guard Assistance,   Min=Minimal Assistance, Mod=Moderate Assistance, Max=Maximal Assistance, Total=Total Assistance, NT=Not Tested  GAIT: I Mod I S SBA CGA Min Mod Max Total  NT x2 Comments:   Level of Assistance [] [] [] [x] [] [] [] [] [] [] []    Distance 20ft and then 10ft    DME None    Gait Quality Slow, shuffled    Weightbearing Status N/A     I=Independent, Mod I=Modified Independent, S=Supervision, SBA=Standby Assistance, CGA=Contact Guard Assistance,   Min=Minimal Assistance, Mod=Moderate Assistance, Max=Maximal Assistance, Total=Total Assistance, NT=Not [facility] 0 Clicks   Basic Mobility Inpatient Short Form       How much difficulty does the patient currently have. .. Unable A Lot A Little None   1. Turning over in bed (including adjusting bedclothes, sheets and blankets)? [] 1   [] 2   [] 3   [x] 4   2. Sitting down on and standing up from a chair with arms ( e.g., wheelchair, bedside commode, etc.)   [] 1   [] 2   [] 3   [x] 4   3. Moving from lying on back to sitting on the side of the bed? [] 1   [] 2   [] 3   [x] 4   How much help from another person does the patient currently need. .. Total A Lot A Little None   4. Moving to and from a bed to a chair (including a wheelchair)? [] 1   [] 2   [] 3   [x] 4   5. Need to walk in hospital room? [] 1   [] 2   [] 3   [x] 4   6. Climbing 3-5 steps with a railing? [] 1   [] 2   [x] 3   [] 4   © 2007, Trustees of [facility] [employer], under license to [employer]. All rights reserved     Score:  Initial: 23 Most Recent: X (Date: -- )    Interpretation of Tool:  Represents activities that are increasingly more difficult (i.e. Bed mobility, Transfers, Gait). PLAN:   FREQUENCY/DURATION: PT Plan of Care: 3 times/week for duration of hospital stay or until stated goals are met, whichever comes first.    PROBLEM LIST:   (Skilled intervention is medically necessary to address:)  1. Decreased ADL/Functional Activities  2. Decreased Activity Tolerance  3. Decreased Balance  4. Decreased Coordination  5. Decreased Gait Ability  6. Decreased Strength  7. Decreased Transfer Abilities   INTERVENTIONS PLANNED:   (Benefits and precautions of physical therapy have been discussed with the patient.)  1. Therapeutic Activity  2. Therapeutic Exercise/HEP  3. Neuromuscular Re-education  4. Gait Training  5. Education     TREATMENT:     EVALUATION: Low Complexity : (Untimed Charge)    TREATMENT:   ($$ Therapeutic Activity: 23-37 mins    )  Therapeutic Activity (23 Minutes):  Therapeutic activity included Supine to Sit, Scooting, Transfer Training, Ambulation on level ground, Sitting balance  and Standing balance to improve functional Mobility, Strength and Activity tolerance.     TREATMENT GRID:  N/A    AFTER TREATMENT POSITION/PRECAUTIONS:  Chair, Needs within reach and RN notified    INTERDISCIPLINARY COLLABORATION:  RN/PCT and PT/PTA    TOTAL TREATMENT DURATION:  PT Patient Time In/Time Out  Time In: 2226  Time Out: [address]

## 2021-12-06 NOTE — PROGRESS NOTES
12/05/21 2132   Dual Skin Pressure Injury Assessment   Dual Skin Pressure Injury Assessment WDL   Second Care Provider (Based on [address] [place] [address]) Jordi Ledesma RN   Skin Integumentary   Skin Integumentary (WDL) WDL    Pressure  Injury Documentation No Pressure Injury Noted-Pressure Ulcer Prevention Initiated   Skin Color Appropriate for ethnicity   Skin Condition/Temp Dry;  Warm   Skin Integrity Other (comment)  (old healed back scar)   Wound Prevention and Protection Methods   Orientation of Wound Prevention Posterior   Location of Wound Prevention Sacrum/Coccyx   Dressing Present  No   Wound Offloading (Prevention Methods) Bed, pressure reduction mattress

## 2021-12-07 ENCOUNTER — APPOINTMENT (OUTPATIENT)
Dept: GENERAL RADIOLOGY | Age: 61
DRG: 683 | End: 2021-12-07
Attending: STUDENT IN AN ORGANIZED HEALTH CARE EDUCATION/TRAINING PROGRAM

## 2021-12-07 LAB
ALBUMIN SERPL-MCNC: 2.9 G/DL (ref 3.2–4.6)
ALBUMIN/GLOB SERPL: 1 {RATIO} (ref 1.2–3.5)
ALP SERPL-CCNC: 57 U/L (ref 50–136)
ALT SERPL-CCNC: 51 U/L (ref 12–65)
ANION GAP SERPL CALC-SCNC: 8 MMOL/L (ref 7–16)
AST SERPL-CCNC: 45 U/L (ref 15–37)
BASOPHILS # BLD: 0 K/UL (ref 0–0.2)
BASOPHILS NFR BLD: 1 % (ref 0–2)
BILIRUB SERPL-MCNC: 0.5 MG/DL (ref 0.2–1.1)
BUN SERPL-MCNC: 26 MG/DL (ref 8–23)
CALCIUM SERPL-MCNC: 8.5 MG/DL (ref 8.3–10.4)
CHLORIDE SERPL-SCNC: 109 MMOL/L (ref 98–107)
CO2 SERPL-SCNC: 24 MMOL/L (ref 21–32)
CREAT SERPL-MCNC: 1.83 MG/DL (ref 0.6–1)
DIFFERENTIAL METHOD BLD: ABNORMAL
EOSINOPHIL # BLD: 0.1 K/UL (ref 0–0.8)
EOSINOPHIL NFR BLD: 3 % (ref 0.5–7.8)
ERYTHROCYTE [DISTWIDTH] IN BLOOD BY AUTOMATED COUNT: 12 % (ref 11.9–14.6)
GLOBULIN SER CALC-MCNC: 3 G/DL (ref 2.3–3.5)
GLUCOSE SERPL-MCNC: 108 MG/DL (ref 65–100)
HCT VFR BLD AUTO: 32.1 % (ref 35.8–46.3)
HGB BLD-MCNC: 10.8 G/DL (ref 11.7–15.4)
IMM GRANULOCYTES # BLD AUTO: 0 K/UL (ref 0–0.5)
IMM GRANULOCYTES NFR BLD AUTO: 0 % (ref 0–5)
LYMPHOCYTES # BLD: 1.2 K/UL (ref 0.5–4.6)
LYMPHOCYTES NFR BLD: 34 % (ref 13–44)
MAGNESIUM SERPL-MCNC: 1.8 MG/DL (ref 1.8–2.4)
MCH RBC QN AUTO: 30.2 PG (ref 26.1–32.9)
MCHC RBC AUTO-ENTMCNC: 33.6 G/DL (ref 31.4–35)
MCV RBC AUTO: 89.7 FL (ref 79.6–97.8)
MONOCYTES # BLD: 0.4 K/UL (ref 0.1–1.3)
MONOCYTES NFR BLD: 12 % (ref 4–12)
NEUTS SEG # BLD: 1.8 K/UL (ref 1.7–8.2)
NEUTS SEG NFR BLD: 50 % (ref 43–78)
NRBC # BLD: 0 K/UL (ref 0–0.2)
PLATELET # BLD AUTO: 231 K/UL (ref 150–450)
PMV BLD AUTO: 9 FL (ref 9.4–12.3)
POTASSIUM SERPL-SCNC: 3.4 MMOL/L (ref 3.5–5.1)
PROT SERPL-MCNC: 5.9 G/DL (ref 6.3–8.2)
RBC # BLD AUTO: 3.58 M/UL (ref 4.05–5.2)
SODIUM SERPL-SCNC: 141 MMOL/L (ref 136–145)
WBC # BLD AUTO: 3.5 K/UL (ref 4.3–11.1)

## 2021-12-07 PROCEDURE — 97165 OT EVAL LOW COMPLEX 30 MIN: CPT

## 2021-12-07 PROCEDURE — 74011000250 HC RX REV CODE- 250: Performed by: STUDENT IN AN ORGANIZED HEALTH CARE EDUCATION/TRAINING PROGRAM

## 2021-12-07 PROCEDURE — 74011250636 HC RX REV CODE- 250/636: Performed by: STUDENT IN AN ORGANIZED HEALTH CARE EDUCATION/TRAINING PROGRAM

## 2021-12-07 PROCEDURE — 74011250637 HC RX REV CODE- 250/637: Performed by: FAMILY MEDICINE

## 2021-12-07 PROCEDURE — 83735 ASSAY OF MAGNESIUM: CPT

## 2021-12-07 PROCEDURE — 80053 COMPREHEN METABOLIC PANEL: CPT

## 2021-12-07 PROCEDURE — 65270000029 HC RM PRIVATE

## 2021-12-07 PROCEDURE — 97530 THERAPEUTIC ACTIVITIES: CPT

## 2021-12-07 PROCEDURE — 2709999900 HC NON-CHARGEABLE SUPPLY

## 2021-12-07 PROCEDURE — C9113 INJ PANTOPRAZOLE SODIUM, VIA: HCPCS | Performed by: STUDENT IN AN ORGANIZED HEALTH CARE EDUCATION/TRAINING PROGRAM

## 2021-12-07 PROCEDURE — 97535 SELF CARE MNGMENT TRAINING: CPT

## 2021-12-07 PROCEDURE — 74011250636 HC RX REV CODE- 250/636: Performed by: HOSPITALIST

## 2021-12-07 PROCEDURE — 84132 ASSAY OF SERUM POTASSIUM: CPT

## 2021-12-07 PROCEDURE — 74018 RADEX ABDOMEN 1 VIEW: CPT

## 2021-12-07 PROCEDURE — 74011250637 HC RX REV CODE- 250/637: Performed by: HOSPITALIST

## 2021-12-07 PROCEDURE — 85025 COMPLETE CBC W/AUTO DIFF WBC: CPT

## 2021-12-07 PROCEDURE — 36415 COLL VENOUS BLD VENIPUNCTURE: CPT

## 2021-12-07 RX ORDER — POTASSIUM CHLORIDE 14.9 MG/ML
20 INJECTION INTRAVENOUS ONCE
Status: COMPLETED | OUTPATIENT
Start: 2021-12-07 | End: 2021-12-07

## 2021-12-07 RX ORDER — TRAMADOL HYDROCHLORIDE 50 MG/1
50 TABLET ORAL ONCE
Status: COMPLETED | OUTPATIENT
Start: 2021-12-08 | End: 2021-12-07

## 2021-12-07 RX ORDER — SODIUM CHLORIDE, SODIUM LACTATE, POTASSIUM CHLORIDE, CALCIUM CHLORIDE 600; 310; 30; 20 MG/100ML; MG/100ML; MG/100ML; MG/100ML
100 INJECTION, SOLUTION INTRAVENOUS CONTINUOUS
Status: DISCONTINUED | OUTPATIENT
Start: 2021-12-07 | End: 2021-12-08

## 2021-12-07 RX ADMIN — SODIUM CHLORIDE 40 MG: 9 INJECTION, SOLUTION INTRAMUSCULAR; INTRAVENOUS; SUBCUTANEOUS at 08:06

## 2021-12-07 RX ADMIN — FAMOTIDINE 20 MG: 20 TABLET ORAL at 17:16

## 2021-12-07 RX ADMIN — GABAPENTIN 300 MG: 300 CAPSULE ORAL at 17:16

## 2021-12-07 RX ADMIN — GABAPENTIN 300 MG: 300 CAPSULE ORAL at 08:07

## 2021-12-07 RX ADMIN — ENOXAPARIN SODIUM 30 MG: 100 INJECTION SUBCUTANEOUS at 08:06

## 2021-12-07 RX ADMIN — SODIUM CHLORIDE, SODIUM LACTATE, POTASSIUM CHLORIDE, AND CALCIUM CHLORIDE 150 ML/HR: 600; 310; 30; 20 INJECTION, SOLUTION INTRAVENOUS at 02:28

## 2021-12-07 RX ADMIN — Medication 10 ML: at 05:30

## 2021-12-07 RX ADMIN — FAMOTIDINE 20 MG: 20 TABLET ORAL at 08:07

## 2021-12-07 RX ADMIN — Medication 10 ML: at 21:52

## 2021-12-07 RX ADMIN — CYCLOBENZAPRINE 10 MG: 10 TABLET, FILM COATED ORAL at 16:10

## 2021-12-07 RX ADMIN — Medication 10 ML: at 13:15

## 2021-12-07 RX ADMIN — Medication 500 MG: at 08:07

## 2021-12-07 RX ADMIN — TRAMADOL HYDROCHLORIDE 50 MG: 50 TABLET ORAL at 23:16

## 2021-12-07 RX ADMIN — Medication 10 ML: at 05:27

## 2021-12-07 RX ADMIN — POTASSIUM CHLORIDE 20 MEQ: 14.9 INJECTION, SOLUTION INTRAVENOUS at 08:07

## 2021-12-07 RX ADMIN — SODIUM CHLORIDE, SODIUM LACTATE, POTASSIUM CHLORIDE, AND CALCIUM CHLORIDE 100 ML/HR: 600; 310; 30; 20 INJECTION, SOLUTION INTRAVENOUS at 08:07

## 2021-12-07 RX ADMIN — ATORVASTATIN CALCIUM 40 MG: 40 TABLET, FILM COATED ORAL at 21:50

## 2021-12-07 RX ADMIN — ZOLPIDEM TARTRATE 5 MG: 5 TABLET ORAL at 21:50

## 2021-12-07 RX ADMIN — SODIUM CHLORIDE, SODIUM LACTATE, POTASSIUM CHLORIDE, AND CALCIUM CHLORIDE 100 ML/HR: 600; 310; 30; 20 INJECTION, SOLUTION INTRAVENOUS at 18:38

## 2021-12-07 RX ADMIN — Medication 500 MG: at 17:16

## 2021-12-07 NOTE — PROGRESS NOTES
END OF SHIFT NOTE:    INTAKE/OUTPUT  12/06 0701 - 12/07 0700  In: 9059 [I.V.:3477]  Out: 1115 [Urine:1115]  Voiding: YES  Catheter: NO  Drain:              Flatus: Patient does have flatus present. Stool:  0 occurrences. Characteristics:       Emesis: 0 occurrences. Characteristics:        VITAL SIGNS  Patient Vitals for the past 12 hrs:   Temp Pulse Resp BP SpO2   12/07/21 1548 98 °F (36.7 °C) 94 16 131/83 99 %   12/07/21 1139 97.8 °F (36.6 °C) 92 16 105/79 99 %   12/07/21 0704 97.8 °F (36.6 °C) 100 16 113/81 99 %       Pain Assessment  Pain Intensity 1: 0 (12/07/21 1355)        Patient Stated Pain Goal: 0    Ambulating  Yes    Shift report given to oncoming nurse at the bedside.     Jonathna Washington RN

## 2021-12-07 NOTE — PROGRESS NOTES
Hospitalist Progress Note   Admit Date:  2021 12:55 PM   Name:  Maximus Pierre   Age:  57 y.o. Sex:  female  :  1960   MRN:  [id number]   Room:      Presenting Complaint: Vomiting    Reason(s) for Admission: Acute renal failure (ARF) ([facility] [N17.9]  Dehydration [E86.0]  Nausea & vomiting [R11.2]     Hospital Course & Interval History:   Patient is a 56 y/o female with PMH HTN, obesity, chronic back pain s/p recent surgery, dyslipidemia who presented to ED with nausea, vomiting, diarrhea of 1-week duration with associated poor oral intake. ED workup with creatinine of 2.8, tachycardic 100-110. Patient admitted for MICHELLE due to dehydration from N/V/D of prolonged-duration. PT evaluated with no discharge needs. Subjective (21): Patient alert, oriented, resting in bed. No vomiting or diarrhea overnight. Endorses nausea currently. Denies abdominal pain.      Assessment & Plan:     Acute renal failure ([facility] [address] improving  -sCr 2.8 on presentation with baseline < 1.0 with etiology likely hypovolemic/dehydration from week-long history of nausea/vomiting/diarrhea/poor po intake  -Home benazepril, triamterene, HCTZ, gabapentin held, continue to avoid nephrotoxic medications  -sCr with improvement to 1.83, continue IVF today, monitor labs in am    Possible Viral gastroenteritis  Nausea/Vomiting/Diarrhea, improving  Dehydration, improving  -Symptomatic treatment with IVF and Zofran as needed for nausea  -Florastor BID added, no history of recent antibiotic use to suggest infectious diarrhea  - Large diarrhea episode yesterday afternoon, no vomiting overnight, still with nausea, no recurrence in diarrhea today, tolerated PO intake at breakfast, monitor for continued tolerance today    Dyslipidemia  -Continue statin    Essential hypertension  -BP currently stable without intervention, can re-start home meds as BP and renal function stabilize    Morbid obesity (HCC)   -Increases all-cause mortality, counseling provided for dietary and lifestyle modifications    Spinal stenosis of lumbar region with neurogenic claudication  Lumbar Radiculopathy  -s/p L4-5 transforaminal lumbar interbody fusion with Dr. Rosey Guthrie 9/8/21  -Gabapentin on hold for MICHELLE    Dispo/Discharge Planning:  Anticipate d/c in am if tolerates oral diet today and renal function continues to improve    Diet:  ADULT DIET Regular;  Low Sodium (2 gm)  DVT PPx: Lovenox  Code status: Full Code    Hospital Problems as of 12/7/2021 Date Reviewed: 11/16/2021          Codes Class Noted - Resolved POA    Dehydration ICD-10-CM: E86.0  ICD-9-CM: 276.51  12/5/2021 - Present Yes        Nausea & vomiting ICD-10-CM: R11.2  ICD-9-CM: 787.01  12/5/2021 - Present Yes        * (Principal) Acute renal failure (ARF) ([facility] ICD-10-CM: N17.9  ICD-9-CM: 584.9  12/5/2021 - Present Yes        Viral gastroenteritis ICD-10-CM: A08.4  ICD-9-CM: 008.8  12/5/2021 - Present Unknown        Spinal stenosis of lumbar region with neurogenic claudication ICD-10-CM: M48.062  ICD-9-CM: 724.03  3/22/2021 - Present Yes        Hyperlipidemia ICD-10-CM: E78.5  ICD-9-CM: 272.4  Unknown - Present Yes        Essential (primary) hypertension ICD-10-CM: I10  ICD-9-CM: 401.9  Unknown - Present Yes        Morbid obesity ([facility] ICD-10-CM: E66.01  ICD-9-CM: 278.01  8/25/2015 - Present Yes              Objective:     Patient Vitals for the past 24 hrs:   Temp Pulse Resp BP SpO2   12/07/21 1139 97.8 °F (36.6 °C) 92 16 105/79 99 %   12/07/21 0704 97.8 °F (36.6 °C) 100 16 113/81 99 %   12/07/21 0346 97.7 °F (36.5 °C) 92 16 106/82 97 %   12/07/21 0018 97.8 °F (36.6 °C) 93 18 95/67 97 %   12/06/21 1920 97.3 °F (36.3 °C) (!) 103 17 116/83 100 %     Oxygen Therapy  O2 Sat (%): 99 % (12/07/21 1139)  Pulse via Oximetry: 107 beats per minute (12/05/21 1958)  O2 Device: None (Room air) (12/06/21 1930)    Estimated body mass index is 38.03 kg/m² as calculated from the following:    Height as of this encounter: 5' 7\" (1.702 m). Weight as of this encounter: 110.1 kg (242 lb 12.8 oz). Intake/Output Summary (Last 24 hours) at 12/7/2021 1441  Last data filed at 12/7/2021 1401  Gross per 24 hour   Intake 3957 ml   Output 1850 ml   Net 2107 ml         Physical Exam:   Blood pressure 105/79, pulse 92, temperature 97.8 °F (36.6 °C), resp. rate 16, height 5' 7\" (1.702 m), weight 110.1 kg (242 lb 12.8 oz), last menstrual period 10/06/1992, SpO2 99 %. General:    Well nourished. No overt distress. Alert. Conversational. Calm. Obese  Head:  Normocephalic, atraumatic  Eyes:  Sclerae appear normal.  Pupils equally round. Glasses intact. ENT:  Nares appear normal, no drainage. Moist oral mucosa  Neck:  No restricted ROM. Trachea midline   CV:   RRR. No m/r/g. No jugular venous distension. Lungs:   CTAB. No wheezing, rhonchi, or rales. Respirations even, unlabored  Abdomen: Bowel sounds normoactive. No tenderness to palpation in all quadrants. Soft, non-distended. Extremities: No cyanosis or clubbing. No peripheral edema  Skin:     No rashes and normal coloration. Warm and dry. Neuro:  CN II-XII grossly intact. Sensation intact. A&Ox3  Psych:  Normal mood and affect. I have reviewed ordered lab tests and independently visualized imaging below:    Recent Labs:  Recent Results (from the past 48 hour(s))   METABOLIC PANEL, COMPREHENSIVE    Collection Time: 12/06/21  4:58 AM   Result Value Ref Range    Sodium 140 136 - 145 mmol/L    Potassium 3.5 3.5 - 5.1 mmol/L    Chloride 108 (H) 98 - 107 mmol/L    CO2 24 21 - 32 mmol/L    Anion gap 8 7 - 16 mmol/L    Glucose 109 (H) 65 - 100 mg/dL    BUN 32 (H) 8 - 23 MG/DL    Creatinine 3.66 (H) 0.6 - 1.0 MG/DL    GFR est AA 16 (L) >60 ml/min/1.73m2    GFR est non-AA 13 (L) >60 ml/min/1.73m2    Calcium 8.5 8.3 - 10.4 MG/DL    Bilirubin, total 0.8 0.2 - 1.1 MG/DL    ALT (SGPT) 54 12 - 65 U/L    AST (SGOT) 57 (H) 15 - 37 U/L    Alk.  phosphatase 47 (L) 50 - 136 U/L    Protein, total 5.7 (L) 6.3 - 8.2 g/dL    Albumin 2.9 (L) 3.2 - 4.6 g/dL    Globulin 2.8 2.3 - 3.5 g/dL    A-G Ratio 1.0 (L) 1.2 - 3.5     MAGNESIUM    Collection Time: 12/06/21  4:58 AM   Result Value Ref Range    Magnesium 1.8 1.8 - 2.4 mg/dL   CBC WITH AUTOMATED DIFF    Collection Time: 12/06/21  4:58 AM   Result Value Ref Range    WBC 5.6 4.3 - 11.1 K/uL    RBC 3.71 (L) 4.05 - 5.2 M/uL    HGB 10.8 (L) 11.7 - 15.4 g/dL    HCT 33.1 (L) 35.8 - 46.3 %    MCV 89.2 79.6 - 97.8 FL    MCH 29.1 26.1 - 32.9 PG    MCHC 32.6 31.4 - 35.0 g/dL    RDW 12.2 11.9 - 14.6 %    PLATELET 480 699 - 504 K/uL    MPV 8.7 (L) 9.4 - 12.3 FL    ABSOLUTE NRBC 0.00 0.0 - 0.2 K/uL    DF AUTOMATED      NEUTROPHILS 62 43 - 78 %    LYMPHOCYTES 25 13 - 44 %    MONOCYTES 11 4.0 - 12.0 %    EOSINOPHILS 1 0.5 - 7.8 %    BASOPHILS 1 0.0 - 2.0 %    IMMATURE GRANULOCYTES 0 0.0 - 5.0 %    ABS. NEUTROPHILS 3.5 1.7 - 8.2 K/UL    ABS. LYMPHOCYTES 1.4 0.5 - 4.6 K/UL    ABS. MONOCYTES 0.6 0.1 - 1.3 K/UL    ABS. EOSINOPHILS 0.1 0.0 - 0.8 K/UL    ABS. BASOPHILS 0.0 0.0 - 0.2 K/UL    ABS. IMM. GRANS. 0.0 0.0 - 0.5 K/UL   PROTEIN/CREATININE RATIO, URINE    Collection Time: 12/06/21 10:15 AM   Result Value Ref Range    Protein, urine random 62 (H) <11.9 mg/dL    Creatinine, urine 446.00 mg/dL    Protein/Creat. urine Ratio 0.1     SODIUM, UR, RANDOM    Collection Time: 12/06/21 10:15 AM   Result Value Ref Range    Sodium,urine random 43 MMOL/L   CULTURE, URINE    Collection Time: 12/06/21 10:15 AM    Specimen: Clean catch; Urine   Result Value Ref Range    Special Requests: NO SPECIAL REQUESTS      Culture result:        NO GROWTH AFTER SHORT PERIOD OF INCUBATION. FURTHER RESULTS TO FOLLOW AFTER OVERNIGHT INCUBATION.    URINALYSIS W/ RFLX MICROSCOPIC    Collection Time: 12/06/21 10:16 AM   Result Value Ref Range    Color RED      Appearance CLOUDY      Specific gravity 1.021 1.001 - 1.023      pH (UA) 5.0 5.0 - 9.0      Protein TRACE (A) NEG mg/dL    Glucose Negative mg/dL    Ketone TRACE (A) NEG mg/dL    Bilirubin LARGE (A) NEG      Blood Negative NEG      Urobilinogen 1.0 0.2 - 1.0 EU/dL    Nitrites Negative NEG      Leukocyte Esterase SMALL (A) NEG      WBC 5-10 0 /hpf    RBC 5-10 0 /hpf    Epithelial cells 5-10 0 /hpf    Bacteria 3+ (H) 0 /hpf    Casts 10-20 0 /lpf    Crystals, urine OCCASIONAL 0 /LPF    Other observations RESULTS VERIFIED MANUALLY     METABOLIC PANEL, BASIC    Collection Time: 12/06/21  4:26 PM   Result Value Ref Range    Sodium 138 136 - 145 mmol/L    Potassium 3.4 (L) 3.5 - 5.1 mmol/L    Chloride 105 98 - 107 mmol/L    CO2 25 21 - 32 mmol/L    Anion gap 8 7 - 16 mmol/L    Glucose 105 (H) 65 - 100 mg/dL    BUN 32 (H) 8 - 23 MG/DL    Creatinine 3.07 (H) 0.6 - 1.0 MG/DL    GFR est AA 20 (L) >60 ml/min/1.73m2    GFR est non-AA 16 (L) >60 ml/min/1.73m2    Calcium 8.8 8.3 - 10.4 MG/DL   COVID-19 RAPID TEST    Collection Time: 12/06/21  6:36 PM   Result Value Ref Range    Specimen source Nasopharyngeal      COVID-19 rapid test Not detected NOTD     METABOLIC PANEL, COMPREHENSIVE    Collection Time: 12/07/21  5:45 AM   Result Value Ref Range    Sodium 141 136 - 145 mmol/L    Potassium 3.4 (L) 3.5 - 5.1 mmol/L    Chloride 109 (H) 98 - 107 mmol/L    CO2 24 21 - 32 mmol/L    Anion gap 8 7 - 16 mmol/L    Glucose 108 (H) 65 - 100 mg/dL    BUN 26 (H) 8 - 23 MG/DL    Creatinine 1.83 (H) 0.6 - 1.0 MG/DL    GFR est AA 36 (L) >60 ml/min/1.73m2    GFR est non-AA 30 (L) >60 ml/min/1.73m2    Calcium 8.5 8.3 - 10.4 MG/DL    Bilirubin, total 0.5 0.2 - 1.1 MG/DL    ALT (SGPT) 51 12 - 65 U/L    AST (SGOT) 45 (H) 15 - 37 U/L    Alk.  phosphatase 57 50 - 136 U/L    Protein, total 5.9 (L) 6.3 - 8.2 g/dL    Albumin 2.9 (L) 3.2 - 4.6 g/dL    Globulin 3.0 2.3 - 3.5 g/dL    A-G Ratio 1.0 (L) 1.2 - 3.5     MAGNESIUM    Collection Time: 12/07/21  5:45 AM   Result Value Ref Range    Magnesium 1.8 1.8 - 2.4 mg/dL   CBC WITH AUTOMATED DIFF    Collection Time: 12/07/21  5:45 AM Result Value Ref Range    WBC 3.5 (L) 4.3 - 11.1 K/uL    RBC 3.58 (L) 4.05 - 5.2 M/uL    HGB 10.8 (L) 11.7 - 15.4 g/dL    HCT 32.1 (L) 35.8 - 46.3 %    MCV 89.7 79.6 - 97.8 FL    MCH 30.2 26.1 - 32.9 PG    MCHC 33.6 31.4 - 35.0 g/dL    RDW 12.0 11.9 - 14.6 %    PLATELET 166 052 - 090 K/uL    MPV 9.0 (L) 9.4 - 12.3 FL    ABSOLUTE NRBC 0.00 0.0 - 0.2 K/uL    NEUTROPHILS 50 43 - 78 %    LYMPHOCYTES 34 13 - 44 %    MONOCYTES 12 4.0 - 12.0 %    EOSINOPHILS 3 0.5 - 7.8 %    BASOPHILS 1 0.0 - 2.0 %    IMMATURE GRANULOCYTES 0 0.0 - 5.0 %    ABS. NEUTROPHILS 1.8 1.7 - 8.2 K/UL    ABS. LYMPHOCYTES 1.2 0.5 - 4.6 K/UL    ABS. MONOCYTES 0.4 0.1 - 1.3 K/UL    ABS. EOSINOPHILS 0.1 0.0 - 0.8 K/UL    ABS. BASOPHILS 0.0 0.0 - 0.2 K/UL    ABS. IMM. GRANS. 0.0 0.0 - 0.5 K/UL    DF AUTOMATED         All Micro Results     Procedure Component Value Units Date/Time    CULTURE, URINE [[id number]] Collected: 12/06/21 1015    Order Status: Completed Specimen: Urine from Clean catch Updated: 12/07/21 0646     Special Requests: NO SPECIAL REQUESTS        Culture result:       NO GROWTH AFTER SHORT PERIOD OF INCUBATION. FURTHER RESULTS TO FOLLOW AFTER OVERNIGHT INCUBATION. COVID-19 RAPID TEST [[id number]] Collected: 12/06/21 1836    Order Status: Completed Specimen: Nasopharyngeal Updated: 12/06/21 1859     Specimen source Nasopharyngeal        COVID-19 rapid test Not detected        Comment:      The specimen is NEGATIVE for SARS-CoV-2, the novel coronavirus associated with COVID-19. A negative result does not rule out COVID-19. This test has been authorized by the FDA under an Emergency Use Authorization (EUA) for use by authorized laboratories.         Fact sheet for Healthcare Providers: iTendency.uy  Fact sheet for Patients: iTendency.uy       Methodology: Isothermal Nucleic Acid Amplification               Other Studies:  XR ABD (ELISHA)    Result Date: 12/7/2021  ELISHA INDICATION:   Nausea, epigastric pain Supine views of the abdomen were obtained. FINDINGS: There are few mildly dilated small bowel loops. This is not appear to represent an obstructive pattern. No renal calculi are seen. Lung bases are clear. There is there are surgical changes in the lower lumbar spine. Few mildly dilated small bowel loops in the left abdomen, possible ileus.       Current Meds:  Current Facility-Administered Medications   Medication Dose Route Frequency    lactated Ringers infusion  100 mL/hr IntraVENous CONTINUOUS    pantoprazole (PROTONIX) 40 mg in 0.9% sodium chloride 10 mL injection  40 mg IntraVENous DAILY    promethazine (PHENERGAN) with saline injection 25 mg  25 mg IntraVENous Q6H PRN    gabapentin (NEURONTIN) capsule 300 mg  300 mg Oral BID    sodium chloride (NS) flush 5-10 mL  5-10 mL IntraVENous Q8H    sodium chloride (NS) flush 5-10 mL  5-10 mL IntraVENous PRN    [Held by provider] atenoloL (TENORMIN) tablet 50 mg  50 mg Oral DAILY    atorvastatin (LIPITOR) tablet 40 mg  40 mg Oral QHS    cyclobenzaprine (FLEXERIL) tablet 10 mg  10 mg Oral TID PRN    sodium chloride (NS) flush 5-40 mL  5-40 mL IntraVENous Q8H    sodium chloride (NS) flush 5-40 mL  5-40 mL IntraVENous PRN    acetaminophen (TYLENOL) tablet 650 mg  650 mg Oral Q6H PRN    Or    acetaminophen (TYLENOL) suppository 650 mg  650 mg Rectal Q6H PRN    polyethylene glycol (MIRALAX) packet 17 g  17 g Oral DAILY PRN    ondansetron (ZOFRAN ODT) tablet 4 mg  4 mg Oral Q8H PRN    Or    ondansetron (ZOFRAN) injection 4 mg  4 mg IntraVENous Q6H PRN    enoxaparin (LOVENOX) injection 30 mg  30 mg SubCUTAneous DAILY    potassium chloride 10 mEq in 100 ml IVPB  10 mEq IntraVENous PRN    magnesium sulfate 2 g/50 ml IVPB (premix or compounded)  2 g IntraVENous PRN    famotidine (PEPCID) tablet 20 mg  20 mg Oral BID    alum-mag hydroxide-simeth (MYLANTA) oral suspension 15 mL  15 mL Oral Q6H PRN    Saccharomyces boulardii (FLORASTOR) capsule 500 mg  500 mg Oral BID    influenza vaccine 2021-22 (6 mos+)(PF) (FLUARIX/FLULAVAL/FLUZONE QUAD) injection 0.5 mL  1 Each IntraMUSCular PRIOR TO DISCHARGE    LORazepam (ATIVAN) tablet 1 mg  1 mg Oral DAILY PRN    zolpidem (AMBIEN) tablet 5 mg  5 mg Oral QHS PRN     Labs, vital signs, diagnostic testing reviewed. Case discussed with patient, care team, Dr. Flora Pete.     Signed:  Andrei Conway NP

## 2021-12-07 NOTE — PROGRESS NOTES
ACUTE PHYSICAL THERAPY GOALS:  (Developed with and agreed upon by patient and/or caregiver. )  LTG:  (1.)Ms. Ahsan Brown will move from supine to sit and sit to supine  in bed with INDEPENDENCE within 7 treatment day(s). (2.)Ms. Ahsan Brown will transfer from bed to chair and chair to bed with INDEPENDENT using the least restrictive device within 7 treatment day(s). (3.)Ms. Ahsan Brown will ambulate with INDEPENDENT for 600 feet with the least restrictive device within 7 treatment day(s). (4.)Ms. Ahsan Brown will perform standing static and dynamic balance activities x 23 minutes with SUPERVISION to improve safety within 7 treatment day(s). (5.)Ms. Ahsan Brown will ambulate and/or perform functional activities for  23 consecutive minutes with stable vital signs and no rests required to improve activity tolerance within 7 treatment days    PHYSICAL THERAPY: Daily Note and PM Treatment Day # 2    Luana Ortiz is a 57 y.o. female   PRIMARY DIAGNOSIS: Acute renal failure (ARF) ([address]  Acute renal failure (ARF) ([address] [N17.9]  Dehydration [E86.0]  Nausea & vomiting [R11.2]         ASSESSMENT:     REHAB RECOMMENDATIONS: CURRENT LEVEL OF FUNCTION:  (Most Recently Demonstrated)   Recommendation to date pending progress:  Setting:   No further skilled therapy   Equipment:    None Bed Mobility:   Modified Independent  Sit to Stand:   Independent  Transfers:   Independent  Gait/Mobility:   Supervision     ASSESSMENT:  Ms. Ahsan Brown is supine and willing to walk. She was able to walk 2 laps in the hallway with me without any real issues. Some SOB but has been quite deconditioned since her LB surgery in September. Good progress and will continue to work toward above goals. SUBJECTIVE:   Ms. Ahsan Brown states, \"that felt good. \"    SOCIAL HISTORY/ LIVING ENVIRONMENT: back surgery in September  Home Environment: Private residence  # Steps to Enter: 6  One/Two Story Residence: One story  Living Alone: No  Support Systems: Spouse/Significant Other, Child(hunter) (Daughter: Georgeann Nageotte [phone])  OBJECTIVE:     PAIN: VITAL SIGNS: LINES/DRAINS:   Pre Treatment: Pain Screen  Pain Scale 1: FLACC  Pain Intensity 1: 0  Post Treatment: 0   IV  O2 Device: None (Room air)     MOBILITY: I Mod I S SBA CGA Min Mod Max Total  NT x2 Comments:   Bed Mobility    Rolling [] [] [] [] [] [] [] [] [] [] []    Supine to Sit [] [x] [] [] [] [] [] [] [] [] []    Scooting [] [] [] [] [] [] [] [] [] [] []    Sit to Supine [] [x] [] [] [] [] [] [] [] [] []    Transfers    Sit to Stand [x] [] [] [] [] [] [] [] [] [] []    Bed to Chair [] [] [] [] [] [] [] [] [] [] []    Stand to Sit [x] [] [] [] [] [] [] [] [] [] []    I=Independent, Mod I=Modified Independent, S=Supervision, SBA=Standby Assistance, CGA=Contact Guard Assistance,   Min=Minimal Assistance, Mod=Moderate Assistance, Max=Maximal Assistance, Total=Total Assistance, NT=Not Tested    BALANCE: Good Fair+ Fair Fair- Poor NT Comments   Sitting Static [x] [] [] [] [] []    Sitting Dynamic [x] [] [] [] [] []              Standing Static [x] [] [] [] [] []    Standing Dynamic [x] [] [] [] [] []      GAIT: I Mod I S SBA CGA Min Mod Max Total  NT x2 Comments:   Level of Assistance [] [] [x] [] [] [] [] [] [] [] []    Distance 500    DME None    Gait Quality fine    Weightbearing  Status N/A     I=Independent, Mod I=Modified Independent, S=Supervision, SBA=Standby Assistance, CGA=Contact Guard Assistance,   Min=Minimal Assistance, Mod=Moderate Assistance, Max=Maximal Assistance, Total=Total Assistance, NT=Not Tested    PLAN:   FREQUENCY/DURATION: PT Plan of Care: 3 times/week for duration of hospital stay or until stated goals are met, whichever comes first.  TREATMENT:     TREATMENT:   ($$ Therapeutic Activity: 8-22 mins    )  Therapeutic Activity (15 Minutes):  Therapeutic activity included Supine to Sit, Sit to Supine and Ambulation on level ground to improve functional Mobility, Strength and Activity tolerance.     TREATMENT GRID:  N/A    AFTER TREATMENT POSITION/PRECAUTIONS:  Bed, Needs within reach and RN notified    INTERDISCIPLINARY COLLABORATION:  RN/PCT and PT/PTA    TOTAL TREATMENT DURATION:  PT Patient Time In/Time Out  Time In: 1355  Time Out: [address], PTA

## 2021-12-07 NOTE — PROGRESS NOTES
ACUTE OT GOALS:  (Developed with and agreed upon by patient and/or caregiver.)  1. Patient will complete lower body bathing and dressing with SBA and adaptive equipment as needed. 2. Patient will complete toileting with SBA. 3. Patient will tolerate 25 minutes of OT treatment with 1 rest breaks to increase activity tolerance for ADLs. 4. Patient will complete functional transfers with SPV and adaptive equipment as needed. Timeframe: 7 visits      OCCUPATIONAL THERAPY ASSESSMENT: Initial Assessment and Daily Note OT Treatment Day # 1    Marylu Downing is a 57 y.o. female   PRIMARY DIAGNOSIS: Acute renal failure (ARF) ([facility] [address]  Acute renal failure (ARF) ([facility]) [N17.9]  Dehydration [E86.0]  Nausea & vomiting [R11.2]       Reason for Referral:    ICD-10: Treatment Diagnosis: Generalized Muscle Weakness (M62.81)  Difficulty in walking, Not elsewhere classified (R26.2)  INPATIENT: Payor: /   ASSESSMENT:     REHAB RECOMMENDATIONS:   Recommendation to date pending progress:  Setting:   No further skilled therapy   Equipment:    None     PRIOR LEVEL OF FUNCTION:  (Prior to Hospitalization)  INITIAL/CURRENT LEVEL OF FUNCTION:  (Based on today's evaluation)   Bathing:   Independent  Dressing:   Independent  Feeding/Grooming:   Independent  Toileting:   Independent  Functional Mobility:   Independent Bathing:   Contact Guard Assistance  Dressing:   Standby Assistance  Feeding/Grooming:   Independent  Toileting:   Standby Assistance  Functional Mobility:   Standby Assistance     ASSESSMENT:  Ms. Alexys Leal presents with overall deficits in strength, mobility and ADL performance. She presented to hospital with recurrent N/V over the last week and admitted for acute renal failure. She lives with spouse in 1 level home and reports independence with all ADLs and mobility. She drives and was beginning to go back to work as a  after spine surgery in Sept 2021.  Today, she performed bed mobility and household mobility with SBA and no AD. She groomed while standing at sink and donned undergarment with SBA. She is functioning slightly below her baseline and will benefit from skilled OT during hospital to ensure a safe discharge. OT does not anticipate any OT needs at discharge. SUBJECTIVE:   Ms. Josie Ruth states, \"I got transferred to a new room yesterday. \"    SOCIAL HISTORY/LIVING ENVIRONMENT: Lives with spouse in 1 level home. Working, ind with ADLs, mobility and driving.    Home Environment: Private residence  # Steps to Enter: 6  One/Two Story Residence: One story  Living Alone: No  Support Systems: Spouse/Significant Other, Child(hunter) (Daughter: Maddie Douglas [phone])    OBJECTIVE:     PAIN: VITAL SIGNS: LINES/DRAINS:   Pre Treatment: Pain Screen  Pain Scale 1: Numeric (0 - 10)  Pain Intensity 1: 0  Post Treatment: 0   IV  O2 Device: None (Room air)     GROSS EVALUATION:  B UE Within Functional Limits Abnormal/ Functional Abnormal/ Non-Functional (see comments) Not Tested Comments:   AROM [x] [] [] []    PROM [x] [] [] []    Strength [x] [] [] []    Balance [x] [] [] []    Posture [x] [] [] []    Sensation [x] [] [] []    Coordination [x] [] [] []    Tone [x] [] [] []    Edema [] [] [] []    Activity Tolerance [] [x] [x] [] Decreased from her normal baseline    [] [] [] []      COGNITION/  PERCEPTION: Intact Impaired   (see comments) Comments:   Orientation [x] []    Vision [x] [] Glasses intact   Hearing [x] []    Judgment/ Insight [x] []    Attention [x] []    Memory [x] []    Command Following [x] []    Emotional Regulation [x] []     [] []      ACTIVITIES OF DAILY LIVING: I Mod I S SBA CGA Min Mod Max Total NT Comments   BASIC ADLs:              Bathing/ Showering [] [] [] [] [] [] [] [] [] [x]    Toileting [] [] [] [] [] [] [] [] [] [x]    Dressing [] [] [] [x] [] [] [] [] [] [] Dons underwear   Feeding [] [] [] [] [] [] [] [] [] [x]    Grooming [] [] [] [x] [] [] [] [] [] [] Standing at sink Personal Device Care [] [] [] [] [] [] [] [] [] [x]    Functional Mobility [] [] [] [x] [] [] [] [] [] [] Sup>EOB>sink>chair, no AD   I=Independent, Mod I=Modified Independent, S=Supervision, SBA=Standby Assistance, CGA=Contact Guard Assistance,   Min=Minimal Assistance, Mod=Moderate Assistance, Max=Maximal Assistance, Total=Total Assistance, NT=Not Tested    MOBILITY: I Mod I S SBA CGA Min Mod Max Total  NT x2 Comments:   Supine to sit [] [] [] [x] [] [] [] [] [] [] []    Sit to supine [] [] [] [] [] [] [] [] [] [x] []    Sit to stand [] [] [] [x] [] [] [] [] [] [] []    Bed to chair [] [] [] [x] [] [] [] [] [] [] []    I=Independent, Mod I=Modified Independent, S=Supervision, SBA=Standby Assistance, CGA=Contact Guard Assistance,   Min=Minimal Assistance, Mod=Moderate Assistance, Max=Maximal Assistance, Total=Total Assistance, NT=Not [facility] 6 Clicks   Daily Activity Inpatient Short Form        How much help from another person does the patient currently need. .. Total A Lot A Little None   1. Putting on and taking off regular lower body clothing? [] 1   [] 2   [x] 3   [] 4   2. Bathing (including washing, rinsing, drying)? [] 1   [] 2   [x] 3   [] 4   3. Toileting, which includes using toilet, bedpan or urinal?   [] 1   [] 2   [x] 3   [] 4   4. Putting on and taking off regular upper body clothing? [] 1   [] 2   [] 3   [x] 4   5. Taking care of personal grooming such as brushing teeth? [] 1   [] 2   [] 3   [x] 4   6. Eating meals? [] 1   [] 2   [] 3   [x] 4   © 2007, Trustees of [address] [place] [address] [address] [id number], under license to [employer]. All rights reserved     Score:  Initial: 21 Most Recent: X (Date: -- )   Interpretation of Tool:  Represents activities that are increasingly more difficult (i.e. Bed mobility, Transfers, Gait).     PLAN:   FREQUENCY/DURATION: OT Plan of Care: 3 times/week for duration of hospital stay or until stated goals are met, whichever comes first.    PROBLEM LIST:   (Skilled intervention is medically necessary to address:)  1. Decreased ADL/Functional Activities  2. Decreased Activity Tolerance  3. Decreased Balance  4. Decreased Gait Ability  5. Decreased Strength  6. Decreased Transfer Abilities   INTERVENTIONS PLANNED:   (Benefits and precautions of occupational therapy have been discussed with the patient.)  1. Self Care Training  2. Therapeutic Activity  3. Therapeutic Exercise/HEP  4. Neuromuscular Re-education  5. Education     TREATMENT:     EVALUATION: Low Complexity : (Untimed Charge)    TREATMENT:   ($$ Self Care/Home Management: 23-37 mins    )  Self Care (25 Minutes): Self care including Lower Body Dressing, Grooming and Energy Conservation Training to increase independence and decrease level of assistance required.     TREATMENT GRID:  N/A    AFTER TREATMENT POSITION/PRECAUTIONS:  Chair, Needs within reach and RN notified    INTERDISCIPLINARY COLLABORATION:  RN/PCT and PT/PTA    TOTAL TREATMENT DURATION:  OT Patient Time In/Time Out  Time In: 0910  Time Out: [address], OT

## 2021-12-07 NOTE — PROGRESS NOTES
END OF SHIFT NOTE:    INTAKE/OUTPUT  12/06 0701 - 12/07 0700  In: 1661 [I.V.:3477]  Out: 1115 [Urine:1115]  Voiding: YES  Catheter: NO  Drain:              Flatus: Patient does not have flatus present. Stool:  0 occurrences. Characteristics:       Emesis: 0 occurrences. Characteristics:        VITAL SIGNS  Patient Vitals for the past 12 hrs:   Temp Pulse Resp BP SpO2   12/07/21 0704 97.8 °F (36.6 °C) 100  113/81 99 %   12/07/21 0346 97.7 °F (36.5 °C) 92 16 106/82 97 %   12/07/21 0018 97.8 °F (36.6 °C) 93 18 95/67 97 %   12/06/21 1920 97.3 °F (36.3 °C) (!) 103 17 116/83 100 %       Pain Assessment  Pain Intensity 1: 0 (12/07/21 0220)        Patient Stated Pain Goal: 0    Ambulating  Yes    Shift report given to oncoming nurse at the bedside.     Jeferson Paige RN

## 2021-12-08 LAB
ALBUMIN SERPL-MCNC: 2.6 G/DL (ref 3.2–4.6)
ALBUMIN/GLOB SERPL: 1 {RATIO} (ref 1.2–3.5)
ALP SERPL-CCNC: 51 U/L (ref 50–136)
ALT SERPL-CCNC: 44 U/L (ref 12–65)
ANION GAP SERPL CALC-SCNC: 8 MMOL/L (ref 7–16)
AST SERPL-CCNC: 37 U/L (ref 15–37)
BASOPHILS # BLD: 0 K/UL (ref 0–0.2)
BASOPHILS NFR BLD: 1 % (ref 0–2)
BILIRUB SERPL-MCNC: 0.4 MG/DL (ref 0.2–1.1)
BUN SERPL-MCNC: 17 MG/DL (ref 8–23)
CALCIUM SERPL-MCNC: 8.7 MG/DL (ref 8.3–10.4)
CHLORIDE SERPL-SCNC: 110 MMOL/L (ref 98–107)
CO2 SERPL-SCNC: 25 MMOL/L (ref 21–32)
CREAT SERPL-MCNC: 0.92 MG/DL (ref 0.6–1)
DIFFERENTIAL METHOD BLD: ABNORMAL
EOSINOPHIL # BLD: 0.1 K/UL (ref 0–0.8)
EOSINOPHIL NFR BLD: 2 % (ref 0.5–7.8)
ERYTHROCYTE [DISTWIDTH] IN BLOOD BY AUTOMATED COUNT: 11.9 % (ref 11.9–14.6)
FERRITIN SERPL-MCNC: 51 NG/ML (ref 8–388)
GLOBULIN SER CALC-MCNC: 2.7 G/DL (ref 2.3–3.5)
GLUCOSE SERPL-MCNC: 112 MG/DL (ref 65–100)
HCT VFR BLD AUTO: 29.7 % (ref 35.8–46.3)
HGB BLD-MCNC: 9.8 G/DL (ref 11.7–15.4)
HGB RETIC QN AUTO: 36 PG (ref 29–35)
IMM GRANULOCYTES # BLD AUTO: 0 K/UL (ref 0–0.5)
IMM GRANULOCYTES NFR BLD AUTO: 0 % (ref 0–5)
IMM RETICS NFR: 9.1 % (ref 3–15.9)
IRON SATN MFR SERPL: 15 %
IRON SERPL-MCNC: 49 UG/DL (ref 35–150)
IRON SERPL-MCNC: 51 UG/DL (ref 35–150)
LDH SERPL L TO P-CCNC: 184 U/L (ref 110–210)
LYMPHOCYTES # BLD: 1.3 K/UL (ref 0.5–4.6)
LYMPHOCYTES NFR BLD: 42 % (ref 13–44)
MAGNESIUM SERPL-MCNC: 1.6 MG/DL (ref 1.8–2.4)
MAGNESIUM SERPL-MCNC: 2.4 MG/DL (ref 1.8–2.4)
MCH RBC QN AUTO: 29.1 PG (ref 26.1–32.9)
MCHC RBC AUTO-ENTMCNC: 33 G/DL (ref 31.4–35)
MCV RBC AUTO: 88.1 FL (ref 79.6–97.8)
MONOCYTES # BLD: 0.3 K/UL (ref 0.1–1.3)
MONOCYTES NFR BLD: 9 % (ref 4–12)
NEUTS SEG # BLD: 1.4 K/UL (ref 1.7–8.2)
NEUTS SEG NFR BLD: 46 % (ref 43–78)
NRBC # BLD: 0 K/UL (ref 0–0.2)
PLATELET # BLD AUTO: 228 K/UL (ref 150–450)
PMV BLD AUTO: 8.8 FL (ref 9.4–12.3)
POTASSIUM SERPL-SCNC: 3.4 MMOL/L (ref 3.5–5.1)
POTASSIUM SERPL-SCNC: 3.6 MMOL/L (ref 3.5–5.1)
POTASSIUM SERPL-SCNC: 3.8 MMOL/L (ref 3.5–5.1)
PROT SERPL-MCNC: 5.3 G/DL (ref 6.3–8.2)
RBC # BLD AUTO: 3.37 M/UL (ref 4.05–5.2)
RETICS # AUTO: 0.11 M/UL (ref 0.03–0.1)
RETICS/RBC NFR AUTO: 3 % (ref 0.3–2)
SODIUM SERPL-SCNC: 143 MMOL/L (ref 136–145)
TIBC SERPL-MCNC: 319 UG/DL (ref 250–450)
WBC # BLD AUTO: 3.1 K/UL (ref 4.3–11.1)

## 2021-12-08 PROCEDURE — 83540 ASSAY OF IRON: CPT

## 2021-12-08 PROCEDURE — 74011250636 HC RX REV CODE- 250/636: Performed by: STUDENT IN AN ORGANIZED HEALTH CARE EDUCATION/TRAINING PROGRAM

## 2021-12-08 PROCEDURE — 85025 COMPLETE CBC W/AUTO DIFF WBC: CPT

## 2021-12-08 PROCEDURE — 74011250637 HC RX REV CODE- 250/637: Performed by: PHYSICIAN ASSISTANT

## 2021-12-08 PROCEDURE — 74011250637 HC RX REV CODE- 250/637: Performed by: HOSPITALIST

## 2021-12-08 PROCEDURE — 74011250636 HC RX REV CODE- 250/636: Performed by: HOSPITALIST

## 2021-12-08 PROCEDURE — 74011250637 HC RX REV CODE- 250/637: Performed by: FAMILY MEDICINE

## 2021-12-08 PROCEDURE — 83615 LACTATE (LD) (LDH) ENZYME: CPT

## 2021-12-08 PROCEDURE — 65270000029 HC RM PRIVATE

## 2021-12-08 PROCEDURE — 84132 ASSAY OF SERUM POTASSIUM: CPT

## 2021-12-08 PROCEDURE — 36415 COLL VENOUS BLD VENIPUNCTURE: CPT

## 2021-12-08 PROCEDURE — 83735 ASSAY OF MAGNESIUM: CPT

## 2021-12-08 PROCEDURE — C9113 INJ PANTOPRAZOLE SODIUM, VIA: HCPCS | Performed by: STUDENT IN AN ORGANIZED HEALTH CARE EDUCATION/TRAINING PROGRAM

## 2021-12-08 PROCEDURE — 82728 ASSAY OF FERRITIN: CPT

## 2021-12-08 PROCEDURE — 74011000250 HC RX REV CODE- 250: Performed by: STUDENT IN AN ORGANIZED HEALTH CARE EDUCATION/TRAINING PROGRAM

## 2021-12-08 PROCEDURE — 80053 COMPREHEN METABOLIC PANEL: CPT

## 2021-12-08 PROCEDURE — 2709999900 HC NON-CHARGEABLE SUPPLY

## 2021-12-08 PROCEDURE — 85046 RETICYTE/HGB CONCENTRATE: CPT

## 2021-12-08 RX ORDER — GABAPENTIN 300 MG/1
300 CAPSULE ORAL 3 TIMES DAILY
Status: DISCONTINUED | OUTPATIENT
Start: 2021-12-08 | End: 2021-12-08

## 2021-12-08 RX ORDER — GABAPENTIN 300 MG/1
300 CAPSULE ORAL 3 TIMES DAILY
Status: DISCONTINUED | OUTPATIENT
Start: 2021-12-08 | End: 2021-12-09 | Stop reason: HOSPADM

## 2021-12-08 RX ADMIN — ZOLPIDEM TARTRATE 5 MG: 5 TABLET ORAL at 23:38

## 2021-12-08 RX ADMIN — GABAPENTIN 300 MG: 300 CAPSULE ORAL at 22:25

## 2021-12-08 RX ADMIN — POTASSIUM CHLORIDE 10 MEQ: 7.46 INJECTION, SOLUTION INTRAVENOUS at 07:31

## 2021-12-08 RX ADMIN — GABAPENTIN 300 MG: 300 CAPSULE ORAL at 16:19

## 2021-12-08 RX ADMIN — Medication 10 ML: at 15:17

## 2021-12-08 RX ADMIN — Medication 500 MG: at 17:29

## 2021-12-08 RX ADMIN — ENOXAPARIN SODIUM 30 MG: 100 INJECTION SUBCUTANEOUS at 09:31

## 2021-12-08 RX ADMIN — Medication 10 ML: at 22:26

## 2021-12-08 RX ADMIN — SODIUM CHLORIDE 40 MG: 9 INJECTION, SOLUTION INTRAMUSCULAR; INTRAVENOUS; SUBCUTANEOUS at 09:31

## 2021-12-08 RX ADMIN — POTASSIUM CHLORIDE 10 MEQ: 7.46 INJECTION, SOLUTION INTRAVENOUS at 09:28

## 2021-12-08 RX ADMIN — POTASSIUM CHLORIDE 10 MEQ: 7.46 INJECTION, SOLUTION INTRAVENOUS at 04:27

## 2021-12-08 RX ADMIN — ATORVASTATIN CALCIUM 40 MG: 40 TABLET, FILM COATED ORAL at 22:25

## 2021-12-08 RX ADMIN — POTASSIUM CHLORIDE 10 MEQ: 7.46 INJECTION, SOLUTION INTRAVENOUS at 05:39

## 2021-12-08 RX ADMIN — LORAZEPAM 1 MG: 1 TABLET ORAL at 10:37

## 2021-12-08 RX ADMIN — Medication 500 MG: at 09:31

## 2021-12-08 RX ADMIN — FAMOTIDINE 20 MG: 20 TABLET ORAL at 09:31

## 2021-12-08 RX ADMIN — FAMOTIDINE 20 MG: 20 TABLET ORAL at 17:29

## 2021-12-08 RX ADMIN — Medication 10 ML: at 05:41

## 2021-12-08 RX ADMIN — MAGNESIUM SULFATE HEPTAHYDRATE 2 G: 2 INJECTION, SOLUTION INTRAVENOUS at 01:28

## 2021-12-08 RX ADMIN — GABAPENTIN 300 MG: 300 CAPSULE ORAL at 10:39

## 2021-12-08 NOTE — PROGRESS NOTES
END OF SHIFT NOTE:    INTAKE/OUTPUT  12/07 0701 - 12/08 0700  In: 2084 [P.O.:840; I.V.:1244]  Out: 2150 [Urine:2150]  Voiding: YES  Catheter: NO  Drain:              Flatus: Patient does have flatus present. Stool:  0 occurrences. Characteristics:       Emesis: 0 occurrences. Characteristics:        VITAL SIGNS  Patient Vitals for the past 12 hrs:   Temp Pulse Resp BP SpO2   12/08/21 0234 97.5 °F (36.4 °C) 84 17 119/82 97 %   12/08/21 0128  89  114/77    12/07/21 2321 97.8 °F (36.6 °C) 90 17 117/80 99 %   12/07/21 1935 97.9 °F (36.6 °C) 92 17 112/77 97 %       Pain Assessment  Pain Intensity 1: 2 (12/08/21 0128)  Pain Location 1: Leg  Pain Intervention(s) 1: Rest, Emotional support  Patient Stated Pain Goal: 0    Ambulating  Yes    Shift report to be given to oncoming nurse at the bedside.     Karolina Burnett RN

## 2021-12-08 NOTE — PROGRESS NOTES
Upon entering patients room, patient tearful and complaining of SOB. Vitals taken and stable. Patient stated \"I think I am having a panic attack. \" Ativan 1 mg given PO. Patient at 1100 reports feeling much better. Hospitalist notified.

## 2021-12-08 NOTE — PROGRESS NOTES
Pt has prn orders for Mg and potassium replacement. Her last potassium was 3.4 12/7 at 0545. Pt c/o muscle spasms/cramps in her legs. Pt has flexeril and Tylenol ordered but flexeril isn't due to be given and her pain is 8/10. MD notified. New orders received. 0057 Potassium level 3.4, Mg level 1.6 - 12/7 at 2344. On call MD notified. Writer given permission to replace Mg and potassium per prn orders/replacement protocols.

## 2021-12-08 NOTE — PROGRESS NOTES
Hospitalist Progress Note   Admit Date:  2021 12:55 PM   Name:  Leander Elena   Age:  57 y.o. Sex:  female  :  1960   MRN:  [id number]   Room:      Presenting Complaint: Vomiting    Reason(s) for Admission: Acute renal failure (ARF) ([facility] [address] [N17.9]  Dehydration [E86.0]  Nausea & vomiting [R11.2]     Hospital Course & Interval History:   Leander Elena is a 57 y.o. female with medical history of hypertension, obesity, chronic back pain status post recent back surgery, dyslipidemia who presented to the ER with 1 week history of nausea vomiting and intermittent diarrhea. She c/o increased anorexia and dehydration but denies dizziness, CP, headache, fever, chills, syncope. In ER, pt's work-up was remarkable for creatinine of 2.8, heart rate ranging from 100 - 110. Subjective (21): \"I have pain going down my back and my neurontin was not restarted. \"  Obese 71y.o. WF sitting up in bed c/o lumbago    Review of Systems:  10 systems reviewed and negative except as noted in HPI.       Assessment & Plan:     Principal Problem:    Acute renal failure (ARF) ([facility] [address] (2021)  - resolved with IVF  - suspect etiology from dehydration  - avoid nephrotoxic rx    Active Problems:    Dehydration  - secondary to viral gastroenteritis   - tolerating oral intake      Viral gastroenteritis (2021)  - clinically improved with resolution of symptoms  - tolerating oral intake      Anemia   - precipitous drop from admission at 13.0 ~> 9.8 (3 gram drop)  - no gross bleeding; suspect hemodilution as pt has been receiving IVF since 2021 (dc'd 2021)  - check iron levels / LDH / retic count / ferritin / TIBC / stool for occult blood   - repeat CBC in AM      Lumbago  - s/p L4-5 transforaminal lumbar interbody fusion with Dr. Augustine Ramos on 2021  - neurontin tid dosing resumed      Hyperlipidemia ()  - cont statin tx      Essential (primary) hypertension ()  - resume atenolol  - hold ACE-I in setting of ARF  - monitor BP       Morbid obesity ([facility] (8/25/2015)  - encourage wt loss      Dispo/Discharge Planning:    - if no acute s/sx bleeding and h/h remains stable, anticipate dc home in AM    Diet:  ADULT DIET Regular; Low Sodium (2 gm)  DVT PPx: lovenox sc  Code status: Full Code    Hospital Problems as of 12/8/2021 Date Reviewed: 11/16/2021          Codes Class Noted - Resolved POA    Dehydration ICD-10-CM: E86.0  ICD-9-CM: 276.51  12/5/2021 - Present Yes        Nausea & vomiting ICD-10-CM: R11.2  ICD-9-CM: 787.01  12/5/2021 - Present Yes        * (Principal) Acute renal failure (ARF) ([facility] ICD-10-CM: N17.9  ICD-9-CM: 584.9  12/5/2021 - Present Yes        Viral gastroenteritis ICD-10-CM: A08.4  ICD-9-CM: 008.8  12/5/2021 - Present Unknown        Spinal stenosis of lumbar region with neurogenic claudication ICD-10-CM: M48.062  ICD-9-CM: 724.03  3/22/2021 - Present Yes        Hyperlipidemia ICD-10-CM: E78.5  ICD-9-CM: 272.4  Unknown - Present Yes        Essential (primary) hypertension ICD-10-CM: I10  ICD-9-CM: 401.9  Unknown - Present Yes        Morbid obesity ([facility] ICD-10-CM: E66.01  ICD-9-CM: 278.01  8/25/2015 - Present Yes              Objective:     Patient Vitals for the past 24 hrs:   Temp Pulse Resp BP SpO2   12/08/21 1121 98 °F (36.7 °C) 87 18 (!) 143/92 98 %   12/08/21 0712 97.4 °F (36.3 °C) 84 17 (!) 141/83 98 %   12/08/21 0234 97.5 °F (36.4 °C) 84 17 119/82 97 %   12/08/21 0128  89  114/77    12/07/21 2321 97.8 °F (36.6 °C) 90 17 117/80 99 %   12/07/21 1935 97.9 °F (36.6 °C) 92 17 112/77 97 %   12/07/21 1548 98 °F (36.7 °C) 94 16 131/83 99 %     Oxygen Therapy  O2 Sat (%): 98 % (12/08/21 1121)  Pulse via Oximetry: 107 beats per minute (12/05/21 1958)  O2 Device: None (Room air) (12/08/21 0726)    Estimated body mass index is 38.03 kg/m² as calculated from the following:    Height as of this encounter: 5' 7\" (1.702 m).     Weight as of this encounter: 110.1 kg (242 lb 12.8 oz).    Intake/Output Summary (Last 24 hours) at 12/8/2021 1428  Last data filed at 12/8/2021 1121  Gross per 24 hour   Intake 1604 ml   Output 1800 ml   Net -196 ml         Physical Exam:     Blood pressure (!) 143/92, pulse 87, temperature 98 °F (36.7 °C), resp. rate 18, height 5' 7\" (1.702 m), weight 110.1 kg (242 lb 12.8 oz), last menstrual period 10/06/1992, SpO2 98 %. General:    Morbidly obese, in NAD  Head:  Normocephalic, atraumatic  Eyes:  Sclerae appear normal.  Pupils equally round. ENT:  Nares appear normal, no drainage. Moist oral mucosa  Neck:  No restricted ROM. Trachea midline   CV:   RRR. No m/r/g. No jugular venous distension. Lungs:   CTAB. No wheezing, rhonchi, or rales. Respirations even, unlabored  Abdomen: Bowel sounds present. Soft, nontender, nondistended. Extremities: No cyanosis or clubbing. No edema  Skin:     No rashes and normal coloration. Warm and dry. Neuro:  CN II-XII grossly intact. Sensation intact. A&Ox3  Psych:  Normal mood and affect.       I have reviewed ordered lab tests and independently visualized imaging below:    Recent Labs:  Recent Results (from the past 48 hour(s))   METABOLIC PANEL, BASIC    Collection Time: 12/06/21  4:26 PM   Result Value Ref Range    Sodium 138 136 - 145 mmol/L    Potassium 3.4 (L) 3.5 - 5.1 mmol/L    Chloride 105 98 - 107 mmol/L    CO2 25 21 - 32 mmol/L    Anion gap 8 7 - 16 mmol/L    Glucose 105 (H) 65 - 100 mg/dL    BUN 32 (H) 8 - 23 MG/DL    Creatinine 3.07 (H) 0.6 - 1.0 MG/DL    GFR est AA 20 (L) >60 ml/min/1.73m2    GFR est non-AA 16 (L) >60 ml/min/1.73m2    Calcium 8.8 8.3 - 10.4 MG/DL   COVID-19 RAPID TEST    Collection Time: 12/06/21  6:36 PM   Result Value Ref Range    Specimen source Nasopharyngeal      COVID-19 rapid test Not detected NOTD     METABOLIC PANEL, COMPREHENSIVE    Collection Time: 12/07/21  5:45 AM   Result Value Ref Range    Sodium 141 136 - 145 mmol/L    Potassium 3.4 (L) 3.5 - 5.1 mmol/L Chloride 109 (H) 98 - 107 mmol/L    CO2 24 21 - 32 mmol/L    Anion gap 8 7 - 16 mmol/L    Glucose 108 (H) 65 - 100 mg/dL    BUN 26 (H) 8 - 23 MG/DL    Creatinine 1.83 (H) 0.6 - 1.0 MG/DL    GFR est AA 36 (L) >60 ml/min/1.73m2    GFR est non-AA 30 (L) >60 ml/min/1.73m2    Calcium 8.5 8.3 - 10.4 MG/DL    Bilirubin, total 0.5 0.2 - 1.1 MG/DL    ALT (SGPT) 51 12 - 65 U/L    AST (SGOT) 45 (H) 15 - 37 U/L    Alk. phosphatase 57 50 - 136 U/L    Protein, total 5.9 (L) 6.3 - 8.2 g/dL    Albumin 2.9 (L) 3.2 - 4.6 g/dL    Globulin 3.0 2.3 - 3.5 g/dL    A-G Ratio 1.0 (L) 1.2 - 3.5     MAGNESIUM    Collection Time: 12/07/21  5:45 AM   Result Value Ref Range    Magnesium 1.8 1.8 - 2.4 mg/dL   CBC WITH AUTOMATED DIFF    Collection Time: 12/07/21  5:45 AM   Result Value Ref Range    WBC 3.5 (L) 4.3 - 11.1 K/uL    RBC 3.58 (L) 4.05 - 5.2 M/uL    HGB 10.8 (L) 11.7 - 15.4 g/dL    HCT 32.1 (L) 35.8 - 46.3 %    MCV 89.7 79.6 - 97.8 FL    MCH 30.2 26.1 - 32.9 PG    MCHC 33.6 31.4 - 35.0 g/dL    RDW 12.0 11.9 - 14.6 %    PLATELET 082 996 - 392 K/uL    MPV 9.0 (L) 9.4 - 12.3 FL    ABSOLUTE NRBC 0.00 0.0 - 0.2 K/uL    NEUTROPHILS 50 43 - 78 %    LYMPHOCYTES 34 13 - 44 %    MONOCYTES 12 4.0 - 12.0 %    EOSINOPHILS 3 0.5 - 7.8 %    BASOPHILS 1 0.0 - 2.0 %    IMMATURE GRANULOCYTES 0 0.0 - 5.0 %    ABS. NEUTROPHILS 1.8 1.7 - 8.2 K/UL    ABS. LYMPHOCYTES 1.2 0.5 - 4.6 K/UL    ABS. MONOCYTES 0.4 0.1 - 1.3 K/UL    ABS. EOSINOPHILS 0.1 0.0 - 0.8 K/UL    ABS. BASOPHILS 0.0 0.0 - 0.2 K/UL    ABS. IMM.  GRANS. 0.0 0.0 - 0.5 K/UL    DF AUTOMATED     MAGNESIUM    Collection Time: 12/07/21 11:44 PM   Result Value Ref Range    Magnesium 1.6 (L) 1.8 - 2.4 mg/dL   POTASSIUM    Collection Time: 12/07/21 11:44 PM   Result Value Ref Range    Potassium 3.4 (L) 3.5 - 5.1 mmol/L   METABOLIC PANEL, COMPREHENSIVE    Collection Time: 12/08/21  5:15 AM   Result Value Ref Range    Sodium 143 136 - 145 mmol/L    Potassium 3.6 3.5 - 5.1 mmol/L    Chloride 110 (H) 98 - 107 mmol/L    CO2 25 21 - 32 mmol/L    Anion gap 8 7 - 16 mmol/L    Glucose 112 (H) 65 - 100 mg/dL    BUN 17 8 - 23 MG/DL    Creatinine 0.92 0.6 - 1.0 MG/DL    GFR est AA >60 >60 ml/min/1.73m2    GFR est non-AA >60 >60 ml/min/1.73m2    Calcium 8.7 8.3 - 10.4 MG/DL    Bilirubin, total 0.4 0.2 - 1.1 MG/DL    ALT (SGPT) 44 12 - 65 U/L    AST (SGOT) 37 15 - 37 U/L    Alk. phosphatase 51 50 - 136 U/L    Protein, total 5.3 (L) 6.3 - 8.2 g/dL    Albumin 2.6 (L) 3.2 - 4.6 g/dL    Globulin 2.7 2.3 - 3.5 g/dL    A-G Ratio 1.0 (L) 1.2 - 3.5     MAGNESIUM    Collection Time: 12/08/21  5:15 AM   Result Value Ref Range    Magnesium 2.4 1.8 - 2.4 mg/dL   CBC WITH AUTOMATED DIFF    Collection Time: 12/08/21  5:15 AM   Result Value Ref Range    WBC 3.1 (L) 4.3 - 11.1 K/uL    RBC 3.37 (L) 4.05 - 5.2 M/uL    HGB 9.8 (L) 11.7 - 15.4 g/dL    HCT 29.7 (L) 35.8 - 46.3 %    MCV 88.1 79.6 - 97.8 FL    MCH 29.1 26.1 - 32.9 PG    MCHC 33.0 31.4 - 35.0 g/dL    RDW 11.9 11.9 - 14.6 %    PLATELET 048 691 - 051 K/uL    MPV 8.8 (L) 9.4 - 12.3 FL    ABSOLUTE NRBC 0.00 0.0 - 0.2 K/uL    DF AUTOMATED      NEUTROPHILS 46 43 - 78 %    LYMPHOCYTES 42 13 - 44 %    MONOCYTES 9 4.0 - 12.0 %    EOSINOPHILS 2 0.5 - 7.8 %    BASOPHILS 1 0.0 - 2.0 %    IMMATURE GRANULOCYTES 0 0.0 - 5.0 %    ABS. NEUTROPHILS 1.4 (L) 1.7 - 8.2 K/UL    ABS. LYMPHOCYTES 1.3 0.5 - 4.6 K/UL    ABS. MONOCYTES 0.3 0.1 - 1.3 K/UL    ABS. EOSINOPHILS 0.1 0.0 - 0.8 K/UL    ABS. BASOPHILS 0.0 0.0 - 0.2 K/UL    ABS. IMM.  GRANS. 0.0 0.0 - 0.5 K/UL       All Micro Results     Procedure Component Value Units Date/Time    CULTURE, URINE [[id number]] Collected: 12/06/21 1015    Order Status: Completed Specimen: Urine from Clean catch Updated: 12/08/21 0800     Special Requests: NO SPECIAL REQUESTS        Culture result:       10,000 to 50,000 COLONIES/mL MIXED SKIN JASWINDER ISOLATED          COVID-19 RAPID TEST [[id number]] Collected: 12/06/21 1836    Order Status: Completed Specimen: Nasopharyngeal Updated: 12/06/21 1301     Specimen source Nasopharyngeal        COVID-19 rapid test Not detected        Comment:      The specimen is NEGATIVE for SARS-CoV-2, the novel coronavirus associated with COVID-19. A negative result does not rule out COVID-19. This test has been authorized by the FDA under an Emergency Use Authorization (EUA) for use by authorized laboratories. Fact sheet for Healthcare Providers: [employer]  Fact sheet for Patients: [employer]       Methodology: Isothermal Nucleic Acid Amplification               Other Studies:  No results found.     Current Meds:  Current Facility-Administered Medications   Medication Dose Route Frequency    gabapentin (NEURONTIN) capsule 300 mg  300 mg Oral TID    pantoprazole (PROTONIX) 40 mg in 0.9% sodium chloride 10 mL injection  40 mg IntraVENous DAILY    promethazine (PHENERGAN) with saline injection 25 mg  25 mg IntraVENous Q6H PRN    sodium chloride (NS) flush 5-10 mL  5-10 mL IntraVENous Q8H    sodium chloride (NS) flush 5-10 mL  5-10 mL IntraVENous PRN    atenoloL (TENORMIN) tablet 50 mg  50 mg Oral DAILY    atorvastatin (LIPITOR) tablet 40 mg  40 mg Oral QHS    cyclobenzaprine (FLEXERIL) tablet 10 mg  10 mg Oral TID PRN    sodium chloride (NS) flush 5-40 mL  5-40 mL IntraVENous Q8H    sodium chloride (NS) flush 5-40 mL  5-40 mL IntraVENous PRN    acetaminophen (TYLENOL) tablet 650 mg  650 mg Oral Q6H PRN    Or    acetaminophen (TYLENOL) suppository 650 mg  650 mg Rectal Q6H PRN    polyethylene glycol (MIRALAX) packet 17 g  17 g Oral DAILY PRN    ondansetron (ZOFRAN ODT) tablet 4 mg  4 mg Oral Q8H PRN    Or    ondansetron (ZOFRAN) injection 4 mg  4 mg IntraVENous Q6H PRN    enoxaparin (LOVENOX) injection 30 mg  30 mg SubCUTAneous DAILY    potassium chloride 10 mEq in 100 ml IVPB  10 mEq IntraVENous PRN    magnesium sulfate 2 g/50 ml IVPB (premix or compounded)  2 g IntraVENous PRN    famotidine (PEPCID) tablet 20 mg  20 mg Oral BID    alum-mag hydroxide-simeth (MYLANTA) oral suspension 15 mL  15 mL Oral Q6H PRN    Saccharomyces boulardii (FLORASTOR) capsule 500 mg  500 mg Oral BID    influenza vaccine 2021-22 (6 mos+)(PF) (FLUARIX/FLULAVAL/FLUZONE QUAD) injection 0.5 mL  1 Each IntraMUSCular PRIOR TO DISCHARGE    LORazepam (ATIVAN) tablet 1 mg  1 mg Oral DAILY PRN    zolpidem (AMBIEN) tablet 5 mg  5 mg Oral QHS PRN       Signed:  BEAN Kelly    Part of this note may have been written by using a voice dictation software. The note has been proof read but may still contain some grammatical/other typographical errors.

## 2021-12-09 VITALS
HEART RATE: 72 BPM | OXYGEN SATURATION: 96 % | RESPIRATION RATE: 18 BRPM | TEMPERATURE: 98.2 F | BODY MASS INDEX: 38.77 KG/M2 | HEIGHT: 67 IN | WEIGHT: 247 LBS | DIASTOLIC BLOOD PRESSURE: 83 MMHG | SYSTOLIC BLOOD PRESSURE: 127 MMHG

## 2021-12-09 LAB
BACTERIA SPEC CULT: NORMAL
BACTERIA SPEC CULT: NORMAL
BASOPHILS # BLD: 0 K/UL (ref 0–0.2)
BASOPHILS NFR BLD: 1 % (ref 0–2)
DIFFERENTIAL METHOD BLD: ABNORMAL
EOSINOPHIL # BLD: 0.1 K/UL (ref 0–0.8)
EOSINOPHIL NFR BLD: 2 % (ref 0.5–7.8)
ERYTHROCYTE [DISTWIDTH] IN BLOOD BY AUTOMATED COUNT: 11.9 % (ref 11.9–14.6)
HCT VFR BLD AUTO: 31 % (ref 35.8–46.3)
HGB BLD-MCNC: 10.3 G/DL (ref 11.7–15.4)
IMM GRANULOCYTES # BLD AUTO: 0 K/UL (ref 0–0.5)
IMM GRANULOCYTES NFR BLD AUTO: 0 % (ref 0–5)
LYMPHOCYTES # BLD: 1.4 K/UL (ref 0.5–4.6)
LYMPHOCYTES NFR BLD: 38 % (ref 13–44)
MAGNESIUM SERPL-MCNC: 1.9 MG/DL (ref 1.8–2.4)
MCH RBC QN AUTO: 29.8 PG (ref 26.1–32.9)
MCHC RBC AUTO-ENTMCNC: 33.2 G/DL (ref 31.4–35)
MCV RBC AUTO: 89.6 FL (ref 79.6–97.8)
MONOCYTES # BLD: 0.3 K/UL (ref 0.1–1.3)
MONOCYTES NFR BLD: 8 % (ref 4–12)
NEUTS SEG # BLD: 1.9 K/UL (ref 1.7–8.2)
NEUTS SEG NFR BLD: 51 % (ref 43–78)
NRBC # BLD: 0 K/UL (ref 0–0.2)
PLATELET # BLD AUTO: 249 K/UL (ref 150–450)
PMV BLD AUTO: 8.6 FL (ref 9.4–12.3)
RBC # BLD AUTO: 3.46 M/UL (ref 4.05–5.2)
SERVICE CMNT-IMP: NORMAL
WBC # BLD AUTO: 3.7 K/UL (ref 4.3–11.1)

## 2021-12-09 PROCEDURE — 74011250636 HC RX REV CODE- 250/636: Performed by: STUDENT IN AN ORGANIZED HEALTH CARE EDUCATION/TRAINING PROGRAM

## 2021-12-09 PROCEDURE — 74011250636 HC RX REV CODE- 250/636: Performed by: HOSPITALIST

## 2021-12-09 PROCEDURE — 85025 COMPLETE CBC W/AUTO DIFF WBC: CPT

## 2021-12-09 PROCEDURE — 74011250637 HC RX REV CODE- 250/637: Performed by: HOSPITALIST

## 2021-12-09 PROCEDURE — 74011250637 HC RX REV CODE- 250/637: Performed by: PHYSICIAN ASSISTANT

## 2021-12-09 PROCEDURE — C9113 INJ PANTOPRAZOLE SODIUM, VIA: HCPCS | Performed by: STUDENT IN AN ORGANIZED HEALTH CARE EDUCATION/TRAINING PROGRAM

## 2021-12-09 PROCEDURE — 83735 ASSAY OF MAGNESIUM: CPT

## 2021-12-09 PROCEDURE — 36415 COLL VENOUS BLD VENIPUNCTURE: CPT

## 2021-12-09 PROCEDURE — 74011000250 HC RX REV CODE- 250: Performed by: STUDENT IN AN ORGANIZED HEALTH CARE EDUCATION/TRAINING PROGRAM

## 2021-12-09 RX ADMIN — Medication 500 MG: at 08:34

## 2021-12-09 RX ADMIN — ATENOLOL 50 MG: 50 TABLET ORAL at 08:35

## 2021-12-09 RX ADMIN — SODIUM CHLORIDE 40 MG: 9 INJECTION, SOLUTION INTRAMUSCULAR; INTRAVENOUS; SUBCUTANEOUS at 08:35

## 2021-12-09 RX ADMIN — ENOXAPARIN SODIUM 30 MG: 100 INJECTION SUBCUTANEOUS at 08:35

## 2021-12-09 RX ADMIN — GABAPENTIN 300 MG: 300 CAPSULE ORAL at 08:34

## 2021-12-09 RX ADMIN — FAMOTIDINE 20 MG: 20 TABLET ORAL at 08:34

## 2021-12-09 RX ADMIN — Medication 10 ML: at 06:02

## 2021-12-09 NOTE — PROGRESS NOTES
Problem: Nausea/Vomiting (Adult)  Goal: *Absence of nausea/vomiting  Outcome: Progressing Towards Goal     Problem: Falls - Risk of  Goal: *Absence of Falls  Description: Document Cat Jacques Fall Risk and appropriate interventions in the flowsheet.   Outcome: Progressing Towards Goal  Note: Fall Risk Interventions:            Medication Interventions: Evaluate medications/consider consulting pharmacy, Patient to call before getting OOB, Teach patient to arise slowly    Elimination Interventions: Call light in reach, Patient to call for help with toileting needs

## 2021-12-09 NOTE — DISCHARGE SUMMARY
Hospitalist Discharge Summary   Admit Date:  2021 12:55 PM   DC Note date: 2021  Name:  Valeria Chris   Age:  57 y.o. Sex:  female  :  1960   MRN:  [id number]   Room:    PCP:  Vikash Motley MD    Presenting Complaint: Vomiting    Initial Admission Diagnosis: Acute renal failure (ARF) ([facility] [N17.9]  Dehydration [E86.0]  Nausea & vomiting [R11.2]     Problem List for this Hospitalization:  Hospital Problems as of 2021 Date Reviewed: 2021          Codes Class Noted - Resolved POA    Dehydration ICD-10-CM: E86.0  ICD-9-CM: 276.51  2021 - Present Yes        Nausea & vomiting ICD-10-CM: R11.2  ICD-9-CM: 787.01  2021 - Present Yes        * (Principal) Acute renal failure (ARF) ([facility] ICD-10-CM: N17.9  ICD-9-CM: 584.9  2021 - Present Yes        Viral gastroenteritis ICD-10-CM: A08.4  ICD-9-CM: 008.8  2021 - Present Unknown        Spinal stenosis of lumbar region with neurogenic claudication ICD-10-CM: M48.062  ICD-9-CM: 724.03  3/22/2021 - Present Yes        Hyperlipidemia ICD-10-CM: E78.5  ICD-9-CM: 272.4  Unknown - Present Yes        Essential (primary) hypertension ICD-10-CM: I10  ICD-9-CM: 401.9  Unknown - Present Yes        Morbid obesity ([facility] ICD-10-CM: E66.01  ICD-9-CM: 278.01  2015 - Present Yes            Did Patient have Sepsis (YES OR NO): No    Hospital Course:  Niall Armstrong a 57 y. o. female with medical history of hypertension, obesity, chronic back pain status post recent back surgery, dyslipidemia who presented to the ER with 1 week history of nausea vomiting and intermittent diarrhea. She c/o increased anorexia and dehydration but denies dizziness, CP, headache, fever, chills, syncope. In ER, pt's work-up was remarkable for creatinine of 2.8, heart rate ranging from 100 - 110; lipase was normal @144. Pt was admitted for acute renal failure, suspected due to dehydration.   She was started on IVF and renal function improved with treatment. Pt was taken off ACE-I with care to avoid nephrotoxic medications. Renal US 12/6/2021 was negative for hydronephrosis or renal disease. As her GI symptoms improved and patient tolerated oral intake, IVF was stopped. Last BUN / creatinine on 12/8/2021 was 17 / 0.92 respectively with GFR >60. Pt had a KUB 12/7/2021 which revealed possible ileus. She did not have any further complaints of anorexia / nausea or vomiting and tolerated oral feeds during the hospitalization. During her hospitalization, Ms. Joy Peoples did have a noted drop in her hgb since admission from 13.0 ~> 10.8 ~> 9.8 on 12/8/2021. As pt had been receiving IVF and no gross s/sx of GIB, it was suspected pt's anemia was from hemodilution. Iron studies were wnl and repeated CBC this AM revealed a hgb of 10.3. Today pt is stable for discharge home. She has been instructed to f/u with PCP one week. ACE-I was NOT resumed on discharge; will defer to PCP if BP needs further rx adjustment. Disposition: Home or Self Care  Diet: ADULT DIET Regular; Low Sodium (2 gm)  Code Status: Full Code    Follow Up Orders: Follow-up Appointments   Procedures    FOLLOW UP VISIT Appointment in: One Week PCP     PCP     Standing Status:   Standing     Number of Occurrences:   1     Order Specific Question:   Appointment in     Answer: One Week       Follow-up Information     Follow up With Specialties Details Why Cliff Ham MD Family Medicine On 12/21/2021 3:00 [address] [facility] [id number] [phone]            Follow up labs/diagnostics (ultimately defer to outpatient provider):  None    Time spent in patient discharge and coordination 45 minutes. Plan was discussed with patient, RN, case mgmt. All questions answered. Patient was stable at time of discharge. Instructions given to call a physician or return if any concerns.     Discharge Info:   Current Discharge Medication List      CONTINUE these medications which have NOT CHANGED    Details   nabumetone (RELAFEN) 750 mg tablet Take 750 mg by mouth two (2) times a day. With food      cyclobenzaprine (FLEXERIL) 10 mg tablet Take 1 Tablet by mouth three (3) times daily as needed for Muscle Spasm(s). Indications: muscle spasm  Qty: 21 Tablet, Refills: 1    Associated Diagnoses: Lumbar radiculopathy; Spinal stenosis of lumbar region with neurogenic claudication      gabapentin (NEURONTIN) 300 mg capsule Take 300 mg by mouth three (3) times daily. atorvastatin (LIPITOR) 40 mg tablet Take  by mouth nightly. triamterene-hydroCHLOROthiazide (DYAZIDE) 37.5-25 mg per capsule Take  by mouth daily. atenoloL (TENORMIN) 50 mg tablet Take  by mouth daily. omeprazole (PRILOSEC) 40 mg capsule Take 40 mg by mouth daily. zolpidem (AMBIEN) 5 mg tablet Take 1 Tab by mouth nightly as needed for Sleep. Max Daily Amount: 5 mg. Indications: SLEEP-ONSET INSOMNIA  Qty: 30 Tab, Refills: 5    Associated Diagnoses: Insomnia, unspecified type      butalbital-acetaminophen-caffeine (FIORICET, ESGIC) -40 mg per tablet Take 1 Tab by mouth every six (6) hours as needed for Pain. Indications: Migraine, TENSION-TYPE HEADACHE  Qty: 30 Tab, Refills: 5    Associated Diagnoses: Migraine with aura and without status migrainosus, not intractable      promethazine (PHENERGAN) 25 mg tablet Take 1 Tab by mouth every six (6) hours as needed for Nausea. Indications: Pain Treatment Adjunct  Qty: 30 Tab, Refills: 2    Associated Diagnoses: Migraine with aura and without status migrainosus, not intractable      estradiol (ESTRACE) 1 mg tablet Take 1 Tab by mouth daily. Qty: 30 Tab, Refills: 5    Associated Diagnoses: Surgical menopause on hormone replacement therapy      LORazepam (ATIVAN) 1 mg tablet Take 1 Tab by mouth daily as needed for Anxiety.  Indications: panic attacks  Qty: 15 Tab, Refills: 1    Associated Diagnoses: Panic attacks         STOP taking these medications       benazepriL (LOTENSIN) 40 mg tablet Comments:   Reason for Stopping:               Procedures done this admission:  * No surgery found *    Consults this admission:  None    Echocardiogram/EKG results:  No results found for this or any previous visit. EKG Results     None          Diagnostic Imaging/Tests:   US RETROPERITONEUM LTD    Result Date: 12/6/2021  RENAL ULTRASOUND. INDICATION: Worsening acute renal failure. COMPARISON: None. TECHNIQUE: Direct skin contact sector 3-5 MHz images of the kidneys are obtained. FINDINGS: Renal echogenicity normal, the right kidney is hypoechoic compared to the liver. Right kidney: 13.0 cm in length. No hydronephrosis. Left kidney: 13.1 cm in length. No hydronephrosis. Urinary bladder: Unremarkable. Negative for obstruction. Unremarkable renal ultrasound. All Micro Results     Procedure Component Value Units Date/Time    CULTURE, URINE [[id number]] Collected: 12/06/21 1015    Order Status: Completed Specimen: Urine from Clean catch Updated: 12/09/21 0656     Special Requests: NO SPECIAL REQUESTS        Culture result:       >100,000 COLONIES/mL MIXED SKIN JASWINDER ISOLATED                  THREE OR MORE TYPES OF ORGANISMS ARE PRESENT. THIS IS INDICATIVE OF CONTAMINATION DUE TO IMPROPER COLLECTION TECHNIQUE. PLEASE REPEAT COLLECTION UNLESS PATIENT HAS STARTED ANTIBIOTIC TREATMENT. COVID-19 RAPID TEST [[id number]] Collected: 12/06/21 1836    Order Status: Completed Specimen: Nasopharyngeal Updated: 12/06/21 1859     Specimen source Nasopharyngeal        COVID-19 rapid test Not detected        Comment:      The specimen is NEGATIVE for SARS-CoV-2, the novel coronavirus associated with COVID-19. A negative result does not rule out COVID-19. This test has been authorized by the FDA under an Emergency Use Authorization (EUA) for use by authorized laboratories.         Fact sheet for Healthcare Providers: ConventionUpdate.co.nz  Fact sheet for Patients: ConventionUpdate.co.nz       Methodology: Isothermal Nucleic Acid Amplification               Labs: Results:       BMP, Mg, Phos Recent Labs     12/09/21  0501 12/08/21  1331 12/08/21  0515 12/07/21  2344 12/07/21  0545 12/07/21  0545 12/06/21  1626 12/06/21  1626   NA  --   --  143  --   --  141  --  138   K  --  3.8 3.6 3.4*   < > 3.4*   < > 3.4*   CL  --   --  110*  --   --  109*  --  105   CO2  --   --  25  --   --  24  --  25   AGAP  --   --  8  --   --  8  --  8   BUN  --   --  17  --   --  26*  --  32*   CREA  --   --  0.92  --   --  1.83*  --  3.07*   CA  --   --  8.7  --   --  8.5  --  8.8   GLU  --   --  112*  --   --  108*  --  105*   MG 1.9  --  2.4 1.6*   < > 1.8  --   --     < > = values in this interval not displayed.       CBC Recent Labs     12/09/21  0501 12/08/21  0515 12/07/21  0545   WBC 3.7* 3.1* 3.5*   RBC 3.46* 3.37* 3.58*   HGB 10.3* 9.8* 10.8*   HCT 31.0* 29.7* 32.1*    228 231   GRANS 51 46 50   LYMPH 38 42 34   EOS 2 2 3   MONOS 8 9 12   BASOS 1 1 1   IG 0 0 0   ANEU 1.9 1.4* 1.8   ABL 1.4 1.3 1.2   NORMA 0.1 0.1 0.1   ABM 0.3 0.3 0.4   ABB 0.0 0.0 0.0   AIG 0.0 0.0 0.0      LFT Recent Labs     12/08/21  0515 12/07/21  0545   ALT 44 51   AP 51 57   TP 5.3* 5.9*   ALB 2.6* 2.9*   GLOB 2.7 3.0   AGRAT 1.0* 1.0*      Cardiac Testing No results found for: BNPP, BNP, CPK, RCK1, RCK2, RCK3, RCK4, CKMB, CKNDX, CKND1, TROPT, TROIQ   Coagulation Tests No results found for: PTP, INR, APTT, INREXT   A1c No results found for: HBA1C, [id number]   Lipid Panel Lab Results   Component Value Date/Time    Cholesterol, total 213 (H) 08/21/2018 09:46 AM    HDL Cholesterol 72 08/21/2018 09:46 AM    LDL, calculated 113 (H) 08/21/2018 09:46 AM    VLDL, calculated 28 08/21/2018 09:46 AM    Triglyceride 141 08/21/2018 09:46 AM      Thyroid Panel Lab Results   Component Value Date/Time    TSH 3.820 05/23/2018 08:07 AM    TSH 3.070 09/07/2017 12:06 PM        Most Recent UA Lab Results   Component Value Date/Time    Color RED 12/06/2021 10:16 AM    Appearance CLOUDY 12/06/2021 10:16 AM    Specific gravity 1.037 (H) 09/07/2021 04:34 PM    pH (UA) 5.0 12/06/2021 10:16 AM    Protein TRACE (A) 12/06/2021 10:16 AM    Glucose Negative 12/06/2021 10:16 AM    Ketone TRACE (A) 12/06/2021 10:16 AM    Bilirubin LARGE (A) 12/06/2021 10:16 AM    Blood Negative 12/06/2021 10:16 AM    Urobilinogen 1.0 12/06/2021 10:16 AM    Nitrites Negative 12/06/2021 10:16 AM    Leukocyte Esterase SMALL (A) 12/06/2021 10:16 AM    WBC 5-10 12/06/2021 10:16 AM    RBC 5-10 12/06/2021 10:16 AM    Epithelial cells 5-10 12/06/2021 10:16 AM    Bacteria 3+ (H) 12/06/2021 10:16 AM    Casts 10-20 12/06/2021 10:16 AM    Crystals, urine OCCASIONAL 12/06/2021 10:16 AM    Other observations RESULTS VERIFIED MANUALLY 12/06/2021 10:16 AM          All Labs from Last 24 Hrs:  Recent Results (from the past 24 hour(s))   POTASSIUM    Collection Time: 12/08/21  1:31 PM   Result Value Ref Range    Potassium 3.8 3.5 - 5.1 mmol/L   LD    Collection Time: 12/08/21  3:00 PM   Result Value Ref Range     110 - 210 U/L   RETICULOCYTE COUNT    Collection Time: 12/08/21  3:00 PM   Result Value Ref Range    Reticulocyte count 3.0 (H) 0.3 - 2.0 %    Absolute Retic Cnt. 0.1125 (H) 0.026 - 0.095 M/ul    Immature Retic Fraction 9.1 3.0 - 15.9 %    Retic Hgb Conc. 36 (H) 29 - 35 pg   FERRITIN    Collection Time: 12/08/21  3:00 PM   Result Value Ref Range    Ferritin 51 8 - 388 NG/ML   IRON    Collection Time: 12/08/21  3:00 PM   Result Value Ref Range    Iron 51 35 - 150 ug/dL   TRANSFERRIN SATURATION    Collection Time: 12/08/21  3:00 PM   Result Value Ref Range    Iron 49 35 - 150 ug/dL    TIBC 319 250 - 450 ug/dL    Transferrin Saturation 15 (L) >20 %   MAGNESIUM    Collection Time: 12/09/21  5:01 AM   Result Value Ref Range    Magnesium 1.9 1.8 - 2.4 mg/dL   CBC WITH AUTOMATED DIFF Collection Time: 12/09/21  5:01 AM   Result Value Ref Range    WBC 3.7 (L) 4.3 - 11.1 K/uL    RBC 3.46 (L) 4.05 - 5.2 M/uL    HGB 10.3 (L) 11.7 - 15.4 g/dL    HCT 31.0 (L) 35.8 - 46.3 %    MCV 89.6 79.6 - 97.8 FL    MCH 29.8 26.1 - 32.9 PG    MCHC 33.2 31.4 - 35.0 g/dL    RDW 11.9 11.9 - 14.6 %    PLATELET 574 793 - 438 K/uL    MPV 8.6 (L) 9.4 - 12.3 FL    ABSOLUTE NRBC 0.00 0.0 - 0.2 K/uL    DF AUTOMATED      NEUTROPHILS 51 43 - 78 %    LYMPHOCYTES 38 13 - 44 %    MONOCYTES 8 4.0 - 12.0 %    EOSINOPHILS 2 0.5 - 7.8 %    BASOPHILS 1 0.0 - 2.0 %    IMMATURE GRANULOCYTES 0 0.0 - 5.0 %    ABS. NEUTROPHILS 1.9 1.7 - 8.2 K/UL    ABS. LYMPHOCYTES 1.4 0.5 - 4.6 K/UL    ABS. MONOCYTES 0.3 0.1 - 1.3 K/UL    ABS. EOSINOPHILS 0.1 0.0 - 0.8 K/UL    ABS. BASOPHILS 0.0 0.0 - 0.2 K/UL    ABS. IMM.  GRANS. 0.0 0.0 - 0.5 K/UL       Current Med List in Hospital:   Current Facility-Administered Medications   Medication Dose Route Frequency    gabapentin (NEURONTIN) capsule 300 mg  300 mg Oral TID    pantoprazole (PROTONIX) 40 mg in 0.9% sodium chloride 10 mL injection  40 mg IntraVENous DAILY    promethazine (PHENERGAN) with saline injection 25 mg  25 mg IntraVENous Q6H PRN    sodium chloride (NS) flush 5-10 mL  5-10 mL IntraVENous Q8H    sodium chloride (NS) flush 5-10 mL  5-10 mL IntraVENous PRN    atenoloL (TENORMIN) tablet 50 mg  50 mg Oral DAILY    atorvastatin (LIPITOR) tablet 40 mg  40 mg Oral QHS    cyclobenzaprine (FLEXERIL) tablet 10 mg  10 mg Oral TID PRN    sodium chloride (NS) flush 5-40 mL  5-40 mL IntraVENous Q8H    sodium chloride (NS) flush 5-40 mL  5-40 mL IntraVENous PRN    acetaminophen (TYLENOL) tablet 650 mg  650 mg Oral Q6H PRN    Or    acetaminophen (TYLENOL) suppository 650 mg  650 mg Rectal Q6H PRN    polyethylene glycol (MIRALAX) packet 17 g  17 g Oral DAILY PRN    ondansetron (ZOFRAN ODT) tablet 4 mg  4 mg Oral Q8H PRN    Or    ondansetron (ZOFRAN) injection 4 mg  4 mg IntraVENous Q6H PRN    enoxaparin (LOVENOX) injection 30 mg  30 mg SubCUTAneous DAILY    potassium chloride 10 mEq in 100 ml IVPB  10 mEq IntraVENous PRN    magnesium sulfate 2 g/50 ml IVPB (premix or compounded)  2 g IntraVENous PRN    famotidine (PEPCID) tablet 20 mg  20 mg Oral BID    alum-mag hydroxide-simeth (MYLANTA) oral suspension 15 mL  15 mL Oral Q6H PRN    Saccharomyces boulardii (FLORASTOR) capsule 500 mg  500 mg Oral BID    influenza vaccine 2021-22 (6 mos+)(PF) (FLUARIX/FLULAVAL/FLUZONE QUAD) injection 0.5 mL  1 Each IntraMUSCular PRIOR TO DISCHARGE    LORazepam (ATIVAN) tablet 1 mg  1 mg Oral DAILY PRN    zolpidem (AMBIEN) tablet 5 mg  5 mg Oral QHS PRN       Allergies   Allergen Reactions    Codeine Other (comments)     Slows Down Breathing    Lortab [Hydrocodone-Acetaminophen] Anxiety    Morphine Other (comments)     Slows Down Breathing    Nabumetone Nausea and Vomiting     Immunization History   Administered Date(s) Administered    Pneumococcal Conjugate (PCV-13) 08/25/2015    Tdap 08/25/2015       Recent Vital Data:  Patient Vitals for the past 24 hrs:   Temp Pulse Resp BP SpO2   12/09/21 0759    (!) 147/88    12/09/21 0700 97.3 °F (36.3 °C) 91 19  99 %   12/09/21 0359 97.6 °F (36.4 °C) 83 18 (!) 137/92 97 %   12/09/21 0000 97.6 °F (36.4 °C) 81 18 115/83 97 %   12/08/21 2338  86  135/88    12/08/21 2206 98 °F (36.7 °C) 90 18 124/73 98 %   12/08/21 1512 97.4 °F (36.3 °C) 90 18 (!) 141/92 100 %     Oxygen Therapy  O2 Sat (%): 99 % (12/09/21 0700)  Pulse via Oximetry: 107 beats per minute (12/05/21 1958)  O2 Device: None (Room air) (12/09/21 0759)    Estimated body mass index is 38.69 kg/m² as calculated from the following:    Height as of this encounter: 5' 7\" (1.702 m). Weight as of this encounter: 112 kg (247 lb).     Intake/Output Summary (Last 24 hours) at 12/9/2021 1128  Last data filed at 12/9/2021 0845  Gross per 24 hour   Intake    Output 2400 ml   Net -2400 ml         Physical Exam:    General:          Morbidly obese, in NAD  Head:              Normocephalic, atraumatic  Eyes:              Sclerae appear normal.  Pupils equally round. ENT:               Nares appear normal, no drainage. Moist oral mucosa  Neck:              No restricted ROM. Trachea midline   CV:                 RRR. No m/r/g. No jugular venous distension. Lungs:            CTAB. No wheezing, rhonchi, or rales. Respirations even, unlabored  Abdomen: Bowel sounds present. Soft, nontender, nondistended. Extremities:    No cyanosis or clubbing. No edema  Skin:               No rashes and normal coloration. Warm and dry. Neuro:            CN II-XII grossly intact. Sensation intact. A&Ox3  Psych:            Normal mood and affect      Signed:  BEAN Gonzalez    Part of this note may have been written by using a voice dictation software. The note has been proof read but may still contain some grammatical/other typographical errors.

## 2021-12-09 NOTE — DISCHARGE INSTRUCTIONS
Acute Kidney Injury: Care Instructions  Overview     Acute kidney injury (MICHELLE) is a sudden decrease in kidney function. This can happen over a period of hours, days or, in some cases, weeks. MICHELLE used to be called acute renal failure, but kidney failure doesn't always happen with MICHELLE. Common causes of MICHELLE are serious infection, blood loss, and some medicines. When MICHELLE happens, the kidneys have trouble removing waste and excess fluids from the body. The waste and fluids build up and become harmful. Kidney function may return to normal if the cause of MICHELLE is treated quickly. Your chance of a full recovery depends on what caused the problem, how quickly the cause was treated, and what other medical problems you have. You may have a treatment called dialysis. It does the work of healthy kidneys to remove waste and fluids for a short time. Follow-up care is a key part of your treatment and safety. Be sure to make and go to all appointments, and call your doctor if you are having problems. It's also a good idea to know your test results and keep a list of the medicines you take. How can you care for yourself at home? · Talk to your doctor about how much fluid you should drink. · Eat a balanced diet. Talk to your doctor or a dietitian about what type of diet may be best for you. You may need to limit sodium, potassium, and phosphorus. · If you need dialysis, follow the instructions and schedule for dialysis that your doctor gives you. · Do not smoke. Smoking can make your condition worse. If you need help quitting, talk to your doctor about stop-smoking programs and medicines. These can increase your chances of quitting for good. · Limit alcohol. · Review all of your medicines with your doctor. Do not take any medicines, including nonsteroidal anti-inflammatory drugs (NSAIDs), such as ibuprofen (Advil, Motrin) or naproxen (Aleve), unless your doctor says it is safe for you to do so.   · Make sure that anyone treating you for any health problem knows that you have had MICHELLE. When should you call for help? Call 911 anytime you think you may need emergency care. For example, call if:    · You passed out (lost consciousness). Call your doctor now or seek immediate medical care if:    · You have new or worse nausea and vomiting.     · You have much less urine than normal, or you have no urine.     · You are feeling confused or cannot think clearly.     · You have new or more blood in your urine.     · You have new swelling.     · You are dizzy or lightheaded, or feel like you may faint. Watch closely for changes in your health, and be sure to contact your doctor if:    · You do not get better as expected. Where can you learn more? Go to http://www.gray.com/  Enter P192 in the search box to learn more about \"Acute Kidney Injury: Care Instructions. \"  Current as of: December 17, 2020               Content Version: 13.0  © 2006-2021 [employer]. Care instructions adapted under license by [employer] (which disclaims liability or warranty for this information). If you have questions about a medical condition or this instruction, always ask your healthcare professional. [place] [address] any warranty or liability for your use of this information. DISCHARGE SUMMARY from Nurse    PATIENT INSTRUCTIONS:    After general anesthesia or intravenous sedation, for 24 hours or while taking prescription Narcotics:  · Limit your activities  · Do not drive and operate hazardous machinery  · Do not make important personal or business decisions  · Do  not drink alcoholic beverages  · If you have not urinated within 8 hours after discharge, please contact your surgeon on call.     Report the following to your surgeon:  · Excessive pain, swelling, redness or odor of or around the surgical area  · Temperature over 100.5  · Nausea and vomiting lasting longer than 4 hours or if unable to take medications  · Any signs of decreased circulation or nerve impairment to extremity: change in color, persistent  numbness, tingling, coldness or increase pain  · Any questions    What to do at Home:  Recommended activity: Activity as tolerated    If you experience any of the following symptoms, please call your doctor: Temperature of 100.5 or greater, persistent nausea and vomiting, increased pain, or any signs of abnormal bleeding. *  Please give a list of your current medications to your Primary Care Provider. *  Please update this list whenever your medications are discontinued, doses are      changed, or new medications (including over-the-counter products) are added. *  Please carry medication information at all times in case of emergency situations. These are general instructions for a healthy lifestyle:    No smoking/ No tobacco products/ Avoid exposure to second hand smoke  Surgeon General's Warning:  Quitting smoking now greatly reduces serious risk to your health. Obesity, smoking, and sedentary lifestyle greatly increases your risk for illness    A healthy diet, regular physical exercise & weight monitoring are important for maintaining a healthy lifestyle    You may be retaining fluid if you have a history of heart failure or if you experience any of the following symptoms:  Weight gain of 3 pounds or more overnight or 5 pounds in a week, increased swelling in our hands or feet or shortness of breath while lying flat in bed. Please call your doctor as soon as you notice any of these symptoms; do not wait until your next office visit. The discharge information has been reviewed with the patient. The patient verbalized understanding.   Discharge medications reviewed with the patient and appropriate educational materials and side effects teaching were provided.   ___________________________________________________________________________________________________________________________________

## 2021-12-09 NOTE — PROGRESS NOTES
END OF SHIFT NOTE:    INTAKE/OUTPUT  12/08 0701 - 12/09 0700  In: -   Out: 2800 [Urine:2800]  Voiding: YES  Catheter: NO  Drain:              Flatus: Patient does have flatus present. Stool:  0 occurrences. Characteristics:  Stool Assessment  Stool Appearance: Other (comment) (no stool to assess at this time)    Emesis: 0 occurrences. Characteristics:        VITAL SIGNS  Patient Vitals for the past 12 hrs:   Temp Pulse Resp BP SpO2   12/09/21 0759    (!) 147/88    12/09/21 0700 97.3 °F (36.3 °C) 91 19  99 %   12/09/21 0359 97.6 °F (36.4 °C) 83 18 (!) 137/92 97 %   12/09/21 0000 97.6 °F (36.4 °C) 81 18 115/83 97 %   12/08/21 2338  86  135/88        Pain Assessment  Pain Intensity 1: 7 (12/09/21 0759)  Pain Location 1: Hip, Leg  Pain Intervention(s) 1: Repositioned, Emotional support (pt wants gabapentin, its scheduled)  Patient Stated Pain Goal: 0    Ambulating  Yes    Shift report given to oncoming nurse at the bedside.     Yuliana President, RN

## 2021-12-09 NOTE — PROGRESS NOTES
END OF SHIFT NOTE:    INTAKE/OUTPUT  12/07 0701 - 12/08 0700  In: 2084 [P.O.:840; I.V.:1244]  Out: 2150 [Urine:2150]  Voiding: YES  Catheter: NO  Drain:              Flatus: Patient does have flatus present. Stool:  0 occurrences. Characteristics:  Stool Assessment  Stool Appearance: Other (comment) (no stool to assess at this time)    Emesis: 0 occurrences. Characteristics:        VITAL SIGNS  Patient Vitals for the past 12 hrs:   Temp Pulse Resp BP SpO2   12/08/21 1512 97.4 °F (36.3 °C) 90 18 (!) 141/92 100 %   12/08/21 1121 98 °F (36.7 °C) 87 18 (!) 143/92 98 %   12/08/21 0712 97.4 °F (36.3 °C) 84 17 (!) 141/83 98 %       Pain Assessment  Pain Intensity 1: 5 (12/08/21 0726)  Pain Location 1: Hip, Leg  Pain Intervention(s) 1: Repositioned, Emotional support  Patient Stated Pain Goal: 0    Ambulating  Yes    Shift report given to oncoming nurse at the bedside.     Souleymane Parra RN

## 2021-12-09 NOTE — PROGRESS NOTES
Patient with discharge orders today. No further needs identified to CM. Family to transport patient home. Patient has met all milestones and treatment goals. CM will continue to follow until discharged today. Care Management Interventions  PCP Verified by CM:  Yes (Dr Tony Spindle. [phone])  Mode of Transport at Discharge: Self  Transition of Care Consult (CM Consult): Discharge Planning  Discharge Durable Medical Equipment: Yes (DME: walker)  Physical Therapy Consult: Yes  Occupational Therapy Consult: Yes  Support Systems: Spouse/Significant Other, Child(hunter) (Daughter: Maddie Douglas [phone])  Confirm Follow Up Transport: Self  The Plan for Transition of Care is Related to the Following Treatment Goals : return to baseline  The Patient and/or Patient Representative was Provided with a Choice of Provider and Agrees with the Discharge Plan?: Yes  Freedom of Choice List was Provided with Basic Dialogue that Supports the Patient's Individualized Plan of Care/Goals, Treatment Preferences and Shares the Quality Data Associated with the Providers?: Yes   Resource Information Provided?: No  Discharge Location  Discharge Placement: Home with family assistance

## 2021-12-10 NOTE — PROGRESS NOTES
D/C from unit with belongings and RX. Accompanied by hospital personnel. No distress at time of D/C. Transported via w/c.

## 2021-12-22 ENCOUNTER — APPOINTMENT (OUTPATIENT)
Dept: GENERAL RADIOLOGY | Age: 61
End: 2021-12-22
Attending: EMERGENCY MEDICINE

## 2021-12-22 ENCOUNTER — HOSPITAL ENCOUNTER (EMERGENCY)
Age: 61
Discharge: HOME | End: 2021-12-22
Attending: EMERGENCY MEDICINE

## 2021-12-22 VITALS
TEMPERATURE: 97.7 F | DIASTOLIC BLOOD PRESSURE: 96 MMHG | HEIGHT: 67 IN | HEART RATE: 129 BPM | OXYGEN SATURATION: 95 % | RESPIRATION RATE: 20 BRPM | SYSTOLIC BLOOD PRESSURE: 111 MMHG | BODY MASS INDEX: 38.45 KG/M2 | WEIGHT: 245 LBS

## 2021-12-22 DIAGNOSIS — F41.1 ANXIETY STATE: ICD-10-CM

## 2021-12-22 DIAGNOSIS — F41.0 PANIC ATTACKS: ICD-10-CM

## 2021-12-22 DIAGNOSIS — U07.1 COVID-19 VIRUS INFECTION: Primary | ICD-10-CM

## 2021-12-22 LAB
ALBUMIN SERPL-MCNC: 3.6 G/DL (ref 3.2–4.6)
ALBUMIN/GLOB SERPL: 0.9 {RATIO} (ref 1.2–3.5)
ALP SERPL-CCNC: 57 U/L (ref 50–136)
ALT SERPL-CCNC: 44 U/L (ref 12–65)
ANION GAP SERPL CALC-SCNC: 11 MMOL/L (ref 7–16)
AST SERPL-CCNC: 54 U/L (ref 15–37)
ATRIAL RATE: 134 BPM
BASOPHILS # BLD: 0 K/UL (ref 0–0.2)
BASOPHILS NFR BLD: 0 % (ref 0–2)
BILIRUB SERPL-MCNC: 0.9 MG/DL (ref 0.2–1.1)
BUN SERPL-MCNC: 22 MG/DL (ref 8–23)
CALCIUM SERPL-MCNC: 8.9 MG/DL (ref 8.3–10.4)
CALCULATED P AXIS, ECG09: 41 DEGREES
CALCULATED R AXIS, ECG10: 57 DEGREES
CALCULATED T AXIS, ECG11: 43 DEGREES
CHLORIDE SERPL-SCNC: 96 MMOL/L (ref 98–107)
CO2 SERPL-SCNC: 25 MMOL/L (ref 21–32)
COVID-19 RAPID TEST, COVR: DETECTED
CREAT SERPL-MCNC: 1.52 MG/DL (ref 0.6–1)
DIAGNOSIS, 93000: NORMAL
DIFFERENTIAL METHOD BLD: ABNORMAL
EOSINOPHIL # BLD: 0 K/UL (ref 0–0.8)
EOSINOPHIL NFR BLD: 0 % (ref 0.5–7.8)
ERYTHROCYTE [DISTWIDTH] IN BLOOD BY AUTOMATED COUNT: 11.9 % (ref 11.9–14.6)
GLOBULIN SER CALC-MCNC: 3.8 G/DL (ref 2.3–3.5)
GLUCOSE SERPL-MCNC: 110 MG/DL (ref 65–100)
HCT VFR BLD AUTO: 37.3 % (ref 35.8–46.3)
HGB BLD-MCNC: 12.8 G/DL (ref 11.7–15.4)
IMM GRANULOCYTES # BLD AUTO: 0 K/UL (ref 0–0.5)
IMM GRANULOCYTES NFR BLD AUTO: 0 % (ref 0–5)
LACTATE SERPL-SCNC: 1.1 MMOL/L (ref 0.4–2)
LACTATE SERPL-SCNC: 2.3 MMOL/L (ref 0.4–2)
LYMPHOCYTES # BLD: 1 K/UL (ref 0.5–4.6)
LYMPHOCYTES NFR BLD: 19 % (ref 13–44)
MCH RBC QN AUTO: 29.5 PG (ref 26.1–32.9)
MCHC RBC AUTO-ENTMCNC: 34.3 G/DL (ref 31.4–35)
MCV RBC AUTO: 85.9 FL (ref 79.6–97.8)
MONOCYTES # BLD: 0.3 K/UL (ref 0.1–1.3)
MONOCYTES NFR BLD: 5 % (ref 4–12)
NEUTS SEG # BLD: 3.9 K/UL (ref 1.7–8.2)
NEUTS SEG NFR BLD: 76 % (ref 43–78)
NRBC # BLD: 0 K/UL (ref 0–0.2)
P-R INTERVAL, ECG05: 128 MS
PLATELET # BLD AUTO: 290 K/UL (ref 150–450)
PMV BLD AUTO: 9.2 FL (ref 9.4–12.3)
POTASSIUM SERPL-SCNC: 3.7 MMOL/L (ref 3.5–5.1)
PROT SERPL-MCNC: 7.4 G/DL (ref 6.3–8.2)
Q-T INTERVAL, ECG07: 308 MS
QRS DURATION, ECG06: 78 MS
QTC CALCULATION (BEZET), ECG08: 459 MS
RBC # BLD AUTO: 4.34 M/UL (ref 4.05–5.2)
SODIUM SERPL-SCNC: 132 MMOL/L (ref 136–145)
SOURCE, COVRS: ABNORMAL
VENTRICULAR RATE, ECG03: 134 BPM
WBC # BLD AUTO: 5.1 K/UL (ref 4.3–11.1)

## 2021-12-22 PROCEDURE — 80053 COMPREHEN METABOLIC PANEL: CPT

## 2021-12-22 PROCEDURE — 87040 BLOOD CULTURE FOR BACTERIA: CPT

## 2021-12-22 PROCEDURE — 96374 THER/PROPH/DIAG INJ IV PUSH: CPT

## 2021-12-22 PROCEDURE — 74011000636 HC RX REV CODE- 636: Performed by: EMERGENCY MEDICINE

## 2021-12-22 PROCEDURE — 93005 ELECTROCARDIOGRAM TRACING: CPT | Performed by: EMERGENCY MEDICINE

## 2021-12-22 PROCEDURE — 87635 SARS-COV-2 COVID-19 AMP PRB: CPT

## 2021-12-22 PROCEDURE — M0243 CASIRIVI AND IMDEVI INFUSION: HCPCS

## 2021-12-22 PROCEDURE — 74011000258 HC RX REV CODE- 258: Performed by: EMERGENCY MEDICINE

## 2021-12-22 PROCEDURE — 74011250636 HC RX REV CODE- 250/636: Performed by: EMERGENCY MEDICINE

## 2021-12-22 PROCEDURE — 71045 X-RAY EXAM CHEST 1 VIEW: CPT

## 2021-12-22 PROCEDURE — 83605 ASSAY OF LACTIC ACID: CPT

## 2021-12-22 PROCEDURE — 85025 COMPLETE CBC W/AUTO DIFF WBC: CPT

## 2021-12-22 PROCEDURE — 96375 TX/PRO/DX INJ NEW DRUG ADDON: CPT

## 2021-12-22 PROCEDURE — 99285 EMERGENCY DEPT VISIT HI MDM: CPT

## 2021-12-22 PROCEDURE — 74011250637 HC RX REV CODE- 250/637: Performed by: EMERGENCY MEDICINE

## 2021-12-22 PROCEDURE — 74011000250 HC RX REV CODE- 250: Performed by: EMERGENCY MEDICINE

## 2021-12-22 RX ORDER — SODIUM CHLORIDE, SODIUM LACTATE, POTASSIUM CHLORIDE, CALCIUM CHLORIDE 600; 310; 30; 20 MG/100ML; MG/100ML; MG/100ML; MG/100ML
1000 INJECTION, SOLUTION INTRAVENOUS CONTINUOUS
Status: DISCONTINUED | OUTPATIENT
Start: 2021-12-22 | End: 2021-12-23 | Stop reason: HOSPADM

## 2021-12-22 RX ORDER — LORAZEPAM 1 MG/1
1 TABLET ORAL
Qty: 8 TABLET | Refills: 1 | Status: SHIPPED | OUTPATIENT
Start: 2021-12-22

## 2021-12-22 RX ORDER — SODIUM CHLORIDE 0.9 % (FLUSH) 0.9 %
5-10 SYRINGE (ML) INJECTION AS NEEDED
Status: DISCONTINUED | OUTPATIENT
Start: 2021-12-22 | End: 2021-12-23 | Stop reason: HOSPADM

## 2021-12-22 RX ORDER — FAMOTIDINE 20 MG/1
20 TABLET, FILM COATED ORAL 2 TIMES DAILY
Qty: 20 TABLET | Refills: 0 | Status: SHIPPED | OUTPATIENT
Start: 2021-12-22 | End: 2022-01-01

## 2021-12-22 RX ORDER — SODIUM CHLORIDE 0.9 % (FLUSH) 0.9 %
5-10 SYRINGE (ML) INJECTION EVERY 8 HOURS
Status: DISCONTINUED | OUTPATIENT
Start: 2021-12-22 | End: 2021-12-23 | Stop reason: HOSPADM

## 2021-12-22 RX ORDER — ACETAMINOPHEN 500 MG
1000 TABLET ORAL
Status: COMPLETED | OUTPATIENT
Start: 2021-12-22 | End: 2021-12-22

## 2021-12-22 RX ORDER — ONDANSETRON 2 MG/ML
4 INJECTION INTRAMUSCULAR; INTRAVENOUS ONCE
Status: COMPLETED | OUTPATIENT
Start: 2021-12-22 | End: 2021-12-22

## 2021-12-22 RX ORDER — DIPHENOXYLATE HYDROCHLORIDE AND ATROPINE SULFATE 2.5; .025 MG/1; MG/1
2 TABLET ORAL
Status: COMPLETED | OUTPATIENT
Start: 2021-12-22 | End: 2021-12-22

## 2021-12-22 RX ORDER — PROMETHAZINE HYDROCHLORIDE 25 MG/1
25 TABLET ORAL
Qty: 12 TABLET | Refills: 0 | Status: SHIPPED | OUTPATIENT
Start: 2021-12-22

## 2021-12-22 RX ORDER — ONDANSETRON 8 MG/1
8 TABLET, ORALLY DISINTEGRATING ORAL
Qty: 12 TABLET | Refills: 0 | Status: SHIPPED | OUTPATIENT
Start: 2021-12-22

## 2021-12-22 RX ADMIN — ONDANSETRON 4 MG: 2 INJECTION INTRAMUSCULAR; INTRAVENOUS at 19:37

## 2021-12-22 RX ADMIN — SODIUM CHLORIDE, SODIUM LACTATE, POTASSIUM CHLORIDE, AND CALCIUM CHLORIDE 1000 ML: 600; 310; 30; 20 INJECTION, SOLUTION INTRAVENOUS at 17:35

## 2021-12-22 RX ADMIN — ACETAMINOPHEN 1000 MG: 500 TABLET ORAL at 17:44

## 2021-12-22 RX ADMIN — CASIRIVIMAB AND IMDEVIMAB: 600; 600 INJECTION, SOLUTION, CONCENTRATE INTRAVENOUS at 20:00

## 2021-12-22 RX ADMIN — DIPHENOXYLATE HYDROCHLORIDE AND ATROPINE SULFATE 2 TABLET: 2.5; .025 TABLET ORAL at 19:37

## 2021-12-22 RX ADMIN — SODIUM CHLORIDE, SODIUM LACTATE, POTASSIUM CHLORIDE, AND CALCIUM CHLORIDE 1000 ML: 600; 310; 30; 20 INJECTION, SOLUTION INTRAVENOUS at 19:47

## 2021-12-22 RX ADMIN — FAMOTIDINE 40 MG: 10 INJECTION INTRAVENOUS at 19:02

## 2021-12-22 NOTE — ED TRIAGE NOTES
Pt presents via [facility] from [id number] [place] [address]. Pt reports SOB x 1 week, worse with laying down. Non productive cough. BP 92/70, , ST 12-lead unremarkable, 98% RA. 18G RAC by LENNY.

## 2021-12-23 ENCOUNTER — PATIENT OUTREACH (OUTPATIENT)
Dept: CASE MANAGEMENT | Age: 61
End: 2021-12-23

## 2021-12-23 NOTE — PROGRESS NOTES
Date/Time:  12/23/2021 9:55 AM   Call within 2 business days of discharge: Yes   Attempted to reach patient by telephone. Left HIPPA compliant message requesting a return call.

## 2021-12-23 NOTE — ED NOTES
I have reviewed discharge instructions with the patient. The patient verbalized understanding. Patient left ED via Discharge Method: ambulatory to Home with     Opportunity for questions and clarification provided. Patient given 4 scripts. To continue your aftercare when you leave the hospital, you may receive an automated call from our care team to check in on how you are doing. This is a free service and part of our promise to provide the best care and service to meet your aftercare needs.  If you have questions, or wish to unsubscribe from this service please call [phone]. Thank you for Choosing our [facility].

## 2021-12-23 NOTE — DISCHARGE INSTRUCTIONS
Continue to recuperate at home. If you become short of breath even at rest return for further evaluation. I am prescribing you 2 different kinds of nausea medication and then Pepcid which which should help settle your stomach. Eat and drink whenever you need. Hopefully the antibodies will help and you will gradually improve over the next week.

## 2021-12-23 NOTE — ED PROVIDER NOTES
70-year-old female presenting for ongoing fever, fatigue, low-grade diarrhea and headache. Poor p.o. intake. Patient's had significant issues over the past year and that she was ultimately diagnosed with severe lumbar stenosis that prevented adequate ambulation. She had surgery in September and that is going well but since then she just never been able to get her strength back. She seems to keep having setbacks with different illnesses and various issues. She began having the most recent symptoms about 8 days ago. She was not improving she brought herself to the emergency department for further evaluation. There is been a prolonged wait and patient had blood work and a Covid swab performed prior to my evaluation patient is found to be Covid positive. Other complaints at this time are some sharp chest pain when she tries to take deep breath that results in coughing. The history is provided by the patient and the spouse.    Shortness of Breath         Past Medical History:   Diagnosis Date    Allergic rhinitis     Anxiety     COVID-19 02/2021    not hospitalized, reports flu symptoms, fever, cough     GERD (gastroesophageal reflux disease)     managed with medication    Hypercholesterolemia     Hypertension     Hypocholesterolemia     Insomnia     Lumbar stenosis with neurogenic claudication     Menopause     Migraine with aura     Morbid obesity ([address]        Past Surgical History:   Procedure Laterality Date    HX COLONOSCOPY  2015    HX DILATION AND EVACUATION  1979    HX LUMBAR FUSION  09/08/2021    left L4-5  TLIF    HX DALE AND BSO  1992    for fibroids    HX TUBAL LIGATION Bilateral 1984    HX WISDOM TEETH EXTRACTION  1983         Family History:   Problem Relation Age of Onset    Breast Cancer Cousin 39    Diabetes Mother         type 2    Heart Disease Mother         MVP    Diabetes Father         type 2    Hypertension Father     Kidney Disease Father         renal failure kidney transplant    Heart Surgery Father         CABG x 4    Other Sister         eating disorder    Hypertension Brother     Diabetes Maternal Grandmother     No Known Problems Maternal Grandfather     Colon Cancer Paternal Grandmother 47    Heart Failure Paternal Grandfather     Ovarian Cancer Neg Hx     Uterine Cancer Neg Hx        Social History     Socioeconomic History    Marital status:      Spouse name: Not on file    Number of children: Not on file    Years of education: Not on file    Highest education level: Not on file   Occupational History    Not on file   Tobacco Use    Smoking status: Former Smoker     Packs/day: 0.10     Years: 8.00     Pack years: 0.80     Quit date: 4/18/2018     Years since quitting: 3.6    Smokeless tobacco: Never Used    Tobacco comment: ~2 months not smoking   Vaping Use    Vaping Use: Never used   Substance and Sexual Activity    Alcohol use: Yes     Alcohol/week: 2.0 standard drinks     Types: 2 Glasses of wine per week    Drug use: No    Sexual activity: Yes     Partners: Male     Birth control/protection: Surgical   Other Topics Concern    Not on file   Social History Narrative    , 1 grown daughter, getting remarried 9/3017     Social Determinants of Health     Financial Resource Strain:     Difficulty of Paying Living Expenses: Not on file   Food Insecurity:     Worried About [address] in the Last Year: Not on file    Leila of Food in the Last Year: Not on file   Transportation Needs:     Lack of Transportation (Medical): Not on file    Lack of Transportation (Non-Medical):  Not on file   Physical Activity:     Days of Exercise per Week: Not on file    Minutes of Exercise per Session: Not on file   Stress:     Feeling of Stress : Not on file   Social Connections:     Frequency of Communication with Friends and Family: Not on file    Frequency of Social Gatherings with Friends and Family: Not on file    Attends [employer] Services: Not on file    Active Member of Clubs or Organizations: Not on file    Attends Club or Organization Meetings: Not on file    Marital Status: Not on file   Intimate Partner Violence:     Fear of Current or Ex-Partner: Not on file    Emotionally Abused: Not on file    Physically Abused: Not on file    Sexually Abused: Not on file   Housing Stability:     Unable to Pay for Housing in the Last Year: Not on file    Number of [place] in the Last Year: Not on file    Unstable Housing in the Last Year: Not on file         ALLERGIES: Codeine, Lortab [hydrocodone-acetaminophen], Morphine, and Nabumetone    Review of Systems   Respiratory: Positive for shortness of breath. All other systems reviewed and are negative. Vitals:    12/22/21 1639 12/22/21 1730 12/22/21 1822   BP: 110/81 (!) 89/57 98/66   Pulse: (!) 130 (!) 129 (!) 129   Resp: 20     Temp: (!) 100.9 °F (38.3 °C) (!) 100.5 °F (38.1 °C) 97.7 °F (36.5 °C)   SpO2: 98% 96% 96%   Weight: 111.1 kg (245 lb)     Height: 5' 7\" (1.702 m)              Physical Exam  Vitals and nursing note reviewed. Constitutional:       Appearance: She is well-developed. HENT:      Head: Normocephalic and atraumatic. Eyes:      Conjunctiva/sclera: Conjunctivae normal.      Pupils: Pupils are equal, round, and reactive to light. Cardiovascular:      Rate and Rhythm: Regular rhythm. Tachycardia present. Pulmonary:      Effort: Pulmonary effort is normal. No tachypnea. Breath sounds: Examination of the right-lower field reveals rales. Examination of the left-lower field reveals rales. Rales present. Abdominal:      General: Bowel sounds are normal.      Palpations: Abdomen is soft. Musculoskeletal:         General: Normal range of motion. Cervical back: Normal range of motion and neck supple. Skin:     General: Skin is warm and dry. Neurological:      Mental Status: She is alert and oriented to person, place, and time. MDM  Number of Diagnoses or Management Options  COVID-19 virus infection  Diagnosis management comments: Pleasant 79-year-old female presenting for fatigue, shortness of breath, cough fever and scant diarrhea. Found to be Covid positive. She does qualify for monoclonal antibodies. O2 sats are 97% which is reassuring but she does appear to have an MICHELLE has been hypotensive and tachycardic. I am not concerned for a concurrent bacterial infection so I am treating with fluids.        Amount and/or Complexity of Data Reviewed  Clinical lab tests: ordered and reviewed (Results for orders placed or performed during the hospital encounter of 12/22/21  -COVID-19 RAPID TEST:        Result                      Value             Ref Range           Specimen source             NASAL                                 COVID-19 rapid test         Detected (AA)     NOTD           -LACTIC ACID:        Result                      Value             Ref Range           Lactic acid                 2.3 (H)           0.4 - 2.0 MM*  -CBC WITH AUTOMATED DIFF:        Result                      Value             Ref Range           WBC                         5.1               4.3 - 11.1 K*       RBC                         4.34              4.05 - 5.2 M*       HGB                         12.8              11.7 - 15.4 *       HCT                         37.3              35.8 - 46.3 %       MCV                         85.9              79.6 - 97.8 *       MCH                         29.5              26.1 - 32.9 *       MCHC                        34.3              31.4 - 35.0 *       RDW                         11.9              11.9 - 14.6 %       PLATELET                    290               150 - 450 K/*       MPV                         9.2 (L)           9.4 - 12.3 FL       ABSOLUTE NRBC               0.00              0.0 - 0.2 K/*       DF                          AUTOMATED                             NEUTROPHILS                 76 43 - 78 %           LYMPHOCYTES                 19                13 - 44 %           MONOCYTES                   5                 4.0 - 12.0 %        EOSINOPHILS                 0 (L)             0.5 - 7.8 %         BASOPHILS                   0                 0.0 - 2.0 %         IMMATURE GRANULOCYTES       0                 0.0 - 5.0 %         ABS. NEUTROPHILS            3.9               1.7 - 8.2 K/*       ABS. LYMPHOCYTES            1.0               0.5 - 4.6 K/*       ABS. MONOCYTES              0.3               0.1 - 1.3 K/*       ABS. EOSINOPHILS            0.0               0.0 - 0.8 K/*       ABS. BASOPHILS              0.0               0.0 - 0.2 K/*       ABS. IMM. GRANS.            0.0               0.0 - 0.5 K/*  -METABOLIC PANEL, COMPREHENSIVE:        Result                      Value             Ref Range           Sodium                      132 (L)           136 - 145 mm*       Potassium                   3.7               3.5 - 5.1 mm*       Chloride                    96 (L)            98 - 107 mmo*       CO2                         25                21 - 32 mmol*       Anion gap                   11                7 - 16 mmol/L       Glucose                     110 (H)           65 - 100 mg/*       BUN                         22                8 - 23 MG/DL        Creatinine                  1.52 (H)          0.6 - 1.0 MG*       GFR est AA                  45 (L)            >60 ml/min/1*       GFR est non-AA              37 (L)            >60 ml/min/1*       Calcium                     8.9               8.3 - 10.4 M*       Bilirubin, total            0.9               0.2 - 1.1 MG*       ALT (SGPT)                  44                12 - 65 U/L         AST (SGOT)                  54 (H)            15 - 37 U/L         Alk.  phosphatase            57                50 - 136 U/L        Protein, total              7.4               6.3 - 8.2 g/*       Albumin                     3.6 3.2 - 4.6 g/*       Globulin                    3.8 (H)           2.3 - 3.5 g/*       A-G Ratio                   0.9 (L)           1.2 - 3.5      -LACTIC ACID:        Result                      Value             Ref Range           Lactic acid                 1.1               0.4 - 2.0 MM*  -EKG, 12 LEAD, INITIAL:        Result                      Value             Ref Range           Ventricular Rate            134               BPM                 Atrial Rate                 134               BPM                 P-R Interval                128               ms                  QRS Duration                78                ms                  Q-T Interval                308               ms                  QTC Calculation (Bezet)     459               ms                  Calculated P Axis           41                degrees             Calculated R Axis           57                degrees             Calculated T Axis           43                degrees             Diagnosis                                                     Sinus tachycardia   Cannot rule out Anterior infarct , age undetermined   Abnormal ECG   When compared with ECG of 02-JAN-2003 10:09,   Vent. rate has increased BY  76 BPM   QRS duration has decreased   Confirmed by Pelon Anguiano ([id number]) on 12/22/2021 7:35:36 PM     )  Tests in the radiology section of CPT®: ordered and reviewed (XR ABD (KUB)    Result Date: 12/7/2021  KUB INDICATION:   Nausea, epigastric pain Supine views of the abdomen were obtained. FINDINGS: There are few mildly dilated small bowel loops. This is not appear to represent an obstructive pattern. No renal calculi are seen. Lung bases are clear. There is there are surgical changes in the lower lumbar spine. Few mildly dilated small bowel loops in the left abdomen, possible ileus. US RETROPERITONEUM LTD    Result Date: 12/6/2021  RENAL ULTRASOUND. INDICATION: Worsening acute renal failure. COMPARISON: None. TECHNIQUE: Direct skin contact sector 3-5 MHz images of the kidneys are obtained. FINDINGS: Renal echogenicity normal, the right kidney is hypoechoic compared to the liver. Right kidney: 13.0 cm in length. No hydronephrosis. Left kidney: 13.1 cm in length. No hydronephrosis. Urinary bladder: Unremarkable. Negative for obstruction. Unremarkable renal ultrasound. XR CHEST PORT    Result Date: 12/22/2021  Chest X-ray INDICATION: Fever and shortness of breath A portable AP view of the chest was obtained. FINDINGS: The lungs are clear. There are no infiltrates or effusions. The heart size is normal.  The bony thorax is intact. No acute findings in the chest     )  Tests in the medicine section of CPT®: ordered and reviewed  Decide to obtain previous medical records or to obtain history from someone other than the patient: yes  Obtain history from someone other than the patient: yes  Independent visualization of images, tracings, or specimens: yes    Risk of Complications, Morbidity, and/or Mortality  Presenting problems: high  Diagnostic procedures: high  Management options: moderate  General comments: Patient is feeling better. Vital signs are normalized. Lactic acid is cleared. I personally reviewed the patient's vital signs, laboratory tests, and/or radiological findings. I discussed these findings with the patient and their significance. I answered all questions and gave the patient clear return precautions.   The patient was discharged from the emergency department in stable condition        Patient Progress  Patient progress: improved         Procedures

## 2021-12-27 LAB
BACTERIA SPEC CULT: NORMAL
SERVICE CMNT-IMP: NORMAL

## 2022-03-08 ENCOUNTER — HOSPITAL ENCOUNTER (OUTPATIENT)
Dept: GENERAL RADIOLOGY | Age: 62
Discharge: HOME | End: 2022-03-08

## 2022-03-08 DIAGNOSIS — M48.062 SPINAL STENOSIS OF LUMBAR REGION WITH NEUROGENIC CLAUDICATION: ICD-10-CM

## 2022-03-08 DIAGNOSIS — M54.16 LUMBAR RADICULOPATHY: ICD-10-CM

## 2022-03-08 PROCEDURE — 72100 X-RAY EXAM L-S SPINE 2/3 VWS: CPT

## 2022-03-18 PROBLEM — R11.2 NAUSEA & VOMITING: Status: ACTIVE | Noted: 2021-12-05

## 2022-03-19 PROBLEM — A08.4 VIRAL GASTROENTERITIS: Status: ACTIVE | Noted: 2021-12-05

## 2022-03-19 PROBLEM — N17.9 ACUTE RENAL FAILURE (ARF) (HCC): Status: ACTIVE | Noted: 2021-12-05

## 2022-03-19 PROBLEM — E86.0 DEHYDRATION: Status: ACTIVE | Noted: 2021-12-05

## 2022-03-19 PROBLEM — M48.062 SPINAL STENOSIS OF LUMBAR REGION WITH NEUROGENIC CLAUDICATION: Status: ACTIVE | Noted: 2021-03-22

## 2022-03-19 PROBLEM — M54.16 LUMBAR RADICULOPATHY: Status: ACTIVE | Noted: 2021-03-22

## 2022-03-20 PROBLEM — R68.84 PAIN IN LOWER JAW: Status: ACTIVE | Noted: 2018-05-22

## (undated) DEVICE — DISPOSABLE STANDARD BIPOLAR FORCEPS, NON-STICK,: Brand: SPETZLER-MALIS

## (undated) DEVICE — SUTURE VCRL VIO BR 0 18IN C/R M04 J701D

## (undated) DEVICE — SYR 10ML LUER LOK 1/5ML GRAD --

## (undated) DEVICE — BIPOLAR SEALER 23-112-1 AQM 6.0: Brand: AQUAMANTYS ®

## (undated) DEVICE — JAM SHIDI: Brand: XIA PRECISION SYSTEM

## (undated) DEVICE — GOWN,PREVENTION PLUS,2XL,ST,22/CS: Brand: MEDLINE

## (undated) DEVICE — SURGIFOAM SPNG SZ 100

## (undated) DEVICE — SOLUTION IV 1000ML 0.9% SOD CHL

## (undated) DEVICE — GAUZE,SPONGE,8"X4",12PLY,XRAY,STRL,LF: Brand: MEDLINE

## (undated) DEVICE — CONTAINER,SPECIMEN,O.R.STRL,4.5OZ: Brand: MEDLINE

## (undated) DEVICE — SUTURE VCRL + SZ 3-0 L18IN ABSRB UD SH 1/2 CIR TAPERCUT NDL VCP864D

## (undated) DEVICE — SPINE NEURO: Brand: MEDLINE INDUSTRIES, INC.

## (undated) DEVICE — DRAPE TWL SURG 16X26IN BLU ORB04] ALLCARE INC]

## (undated) DEVICE — BLADE ASSEMB CLP HAIR FINE --

## (undated) DEVICE — STAPLER EXT SKIN 35 WIDE S STL STPL SQUEEZE HNDL VISISTAT

## (undated) DEVICE — 3M™ TEGADERM™ TRANSPARENT FILM DRESSING FRAME STYLE, 1628, 6 IN X 8 IN (15 CM X 20 CM), 10/CT 8CT/CASE: Brand: 3M™ TEGADERM™

## (undated) DEVICE — GOWN,BREATHABLE SLV,AURORA,LG,STRL: Brand: MEDLINE

## (undated) DEVICE — STERILE PACKAGE WITH CANNULA: Brand: LITE BIO DELIVERY SYSTEM

## (undated) DEVICE — PAD,NON-ADHERENT,3X8,STERILE,LF,1/PK: Brand: MEDLINE

## (undated) DEVICE — WILSON FRAME KIT: Brand: MEDLINE INDUSTRIES, INC.

## (undated) DEVICE — REM POLYHESIVE ADULT PATIENT RETURN ELECTRODE: Brand: VALLEYLAB

## (undated) DEVICE — PREP SKN CHLRAPRP APL 26ML STR --

## (undated) DEVICE — AGENT HEMSTAT 8ML FLX TIP MTRX + DISP SURGIFLO

## (undated) DEVICE — C-ARM: Brand: UNBRANDED

## (undated) DEVICE — AGENT HEMSTAT W3XL4IN OXIDIZED REGENERATED CELOS ABSRB FOR

## (undated) DEVICE — INTENDED FOR TISSUE SEPARATION, AND OTHER PROCEDURES THAT REQUIRE A SHARP SURGICAL BLADE TO PUNCTURE OR CUT.: Brand: BARD-PARKER ® STAINLESS STEEL BLADES

## (undated) DEVICE — 2000CC GUARDIAN II: Brand: GUARDIAN